# Patient Record
Sex: MALE | Race: WHITE | NOT HISPANIC OR LATINO | Employment: FULL TIME | URBAN - METROPOLITAN AREA
[De-identification: names, ages, dates, MRNs, and addresses within clinical notes are randomized per-mention and may not be internally consistent; named-entity substitution may affect disease eponyms.]

---
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
## 2024-01-15 DIAGNOSIS — E10.9 TYPE 1 DIABETES MELLITUS WITH HEMOGLOBIN A1C GOAL OF LESS THAN 7.0% (MULTI): ICD-10-CM

## 2024-01-16 DIAGNOSIS — E10.9 TYPE 1 DIABETES MELLITUS WITH HEMOGLOBIN A1C GOAL OF LESS THAN 7.0% (MULTI): ICD-10-CM

## 2024-01-17 DIAGNOSIS — E10.9 TYPE 1 DIABETES MELLITUS WITH HEMOGLOBIN A1C GOAL OF LESS THAN 7.0% (MULTI): ICD-10-CM

## 2024-01-17 PROCEDURE — RXMED WILLOW AMBULATORY MEDICATION CHARGE

## 2024-01-17 RX ORDER — BLOOD-GLUCOSE TRANSMITTER
EACH MISCELLANEOUS
Qty: 1 EACH | Refills: 3 | Status: SHIPPED | OUTPATIENT
Start: 2024-01-17 | End: 2025-01-13

## 2024-01-17 RX ORDER — INSULIN LISPRO 100 [IU]/ML
INJECTION, SOLUTION INTRAVENOUS; SUBCUTANEOUS
Qty: 80 ML | Refills: 3 | Status: SHIPPED | OUTPATIENT
Start: 2024-01-17

## 2024-01-17 RX ORDER — INSULIN ASPART 100 [IU]/ML
INJECTION, SOLUTION INTRAVENOUS; SUBCUTANEOUS
Qty: 30 ML | Refills: 6 | OUTPATIENT
Start: 2024-01-17

## 2024-01-17 RX ORDER — INSULIN ASPART 100 [IU]/ML
INJECTION, SOLUTION INTRAVENOUS; SUBCUTANEOUS
Qty: 30 ML | Refills: 6 | Status: SHIPPED | OUTPATIENT
Start: 2024-01-17 | End: 2024-01-18 | Stop reason: CLARIF

## 2024-01-17 RX ORDER — BLOOD-GLUCOSE SENSOR
EACH MISCELLANEOUS
Qty: 9 EACH | Refills: 3 | Status: SHIPPED | OUTPATIENT
Start: 2024-01-17

## 2024-01-18 ENCOUNTER — PHARMACY VISIT (OUTPATIENT)
Dept: PHARMACY | Facility: CLINIC | Age: 19
End: 2024-01-18
Payer: COMMERCIAL

## 2024-01-19 ENCOUNTER — PHARMACY VISIT (OUTPATIENT)
Dept: PHARMACY | Facility: CLINIC | Age: 19
End: 2024-01-19

## 2024-01-25 ENCOUNTER — APPOINTMENT (OUTPATIENT)
Dept: PEDIATRIC ENDOCRINOLOGY | Facility: CLINIC | Age: 19
End: 2024-01-25
Payer: COMMERCIAL

## 2024-02-01 ENCOUNTER — APPOINTMENT (OUTPATIENT)
Dept: PEDIATRIC ENDOCRINOLOGY | Facility: CLINIC | Age: 19
End: 2024-02-01
Payer: COMMERCIAL

## 2024-02-29 ENCOUNTER — OFFICE VISIT (OUTPATIENT)
Dept: PEDIATRIC ENDOCRINOLOGY | Facility: CLINIC | Age: 19
End: 2024-02-29
Payer: COMMERCIAL

## 2024-02-29 VITALS
BODY MASS INDEX: 32.98 KG/M2 | DIASTOLIC BLOOD PRESSURE: 72 MMHG | HEART RATE: 58 BPM | WEIGHT: 230.38 LBS | SYSTOLIC BLOOD PRESSURE: 120 MMHG | HEIGHT: 70 IN | RESPIRATION RATE: 16 BRPM

## 2024-02-29 DIAGNOSIS — E10.65 TYPE 1 DIABETES MELLITUS WITH HYPERGLYCEMIA (MULTI): Primary | ICD-10-CM

## 2024-02-29 DIAGNOSIS — E66.9 CLASS 1 OBESITY: ICD-10-CM

## 2024-02-29 PROBLEM — E66.811 CLASS 1 OBESITY: Status: ACTIVE | Noted: 2024-02-29

## 2024-02-29 LAB — POC HEMOGLOBIN A1C: 7.6 % (ref 4.2–6.5)

## 2024-02-29 PROCEDURE — 3074F SYST BP LT 130 MM HG: CPT | Performed by: INTERNAL MEDICINE

## 2024-02-29 PROCEDURE — 95251 CONT GLUC MNTR ANALYSIS I&R: CPT | Performed by: INTERNAL MEDICINE

## 2024-02-29 PROCEDURE — 83036 HEMOGLOBIN GLYCOSYLATED A1C: CPT | Performed by: INTERNAL MEDICINE

## 2024-02-29 PROCEDURE — 3078F DIAST BP <80 MM HG: CPT | Performed by: INTERNAL MEDICINE

## 2024-02-29 PROCEDURE — 99214 OFFICE O/P EST MOD 30 MIN: CPT | Performed by: INTERNAL MEDICINE

## 2024-02-29 RX ORDER — BLOOD-GLUCOSE METER
EACH MISCELLANEOUS
Status: ON HOLD | COMMUNITY
Start: 2022-10-20 | End: 2024-03-25 | Stop reason: ENTERED-IN-ERROR

## 2024-02-29 RX ORDER — INSULIN GLARGINE-YFGN 100 [IU]/ML
INJECTION, SOLUTION SUBCUTANEOUS
Status: ON HOLD | COMMUNITY
Start: 2022-03-10 | End: 2024-03-24 | Stop reason: WASHOUT

## 2024-02-29 RX ORDER — BLOOD KETONE TEST, STRIPS
STRIP MISCELLANEOUS
Status: ON HOLD | COMMUNITY
Start: 2022-02-07 | End: 2024-03-25 | Stop reason: ENTERED-IN-ERROR

## 2024-02-29 RX ORDER — BLOOD SUGAR DIAGNOSTIC
STRIP MISCELLANEOUS
Status: ON HOLD | COMMUNITY
Start: 2019-03-01 | End: 2024-03-25 | Stop reason: ENTERED-IN-ERROR

## 2024-02-29 RX ORDER — GLUCAGON 1 MG
VIAL (EA) INJECTION
Status: ON HOLD | COMMUNITY
Start: 2016-04-18 | End: 2024-03-25 | Stop reason: ENTERED-IN-ERROR

## 2024-02-29 NOTE — PROGRESS NOTES
Subjective   Alfie Kruger is a 18 y.o. male with type 1 diabetes.   Today Alfie presents to clinic with his girlfriend, Joy    Other Medical History:  celiac disease  Manages diabetes with Tandem x2 with Control IQ    CURRENT PUMP SETTINGS:  Tandem x2 with Control IQ   insulin aspart 100 unit/mL (3 mL) pen (NovoLOG)   Active insulin time = 5 hours with Control IQ      Basal Rate   Total Basal Dose: 37.8 units/day   Time units/hr   12:00 AM 1.5    3:00 AM 1.5    6:00 AM 1.6   11:00 AM 1.6    5:00 PM 1.6    9:00 PM 1.6      Blood Glucose Target   Time mg/dL   12:00  - 130    3:00  - 130    6:00  - 110   11:00  - 110    5:00  - 110    9:00  - 130      Sensitivity Factor   Time mg/dL/unit   12:00 AM 20    3:00 AM 20    6:00 AM 20   11:00 AM 20    5:00 PM 20    9:00 PM 20      Carb Ratio   Time g/unit   12:00 AM 7    3:00 AM 7    6:00 AM 5.5   11:00 AM 5.5    5:00 PM 5.5    9:00 PM 5.5     Back up for pump failure   insulin glargine-yfgn 100 unit/mL (3 mL) Pen   Last edited by Susie Daniel RN on 2/29/2024 at 8:33 AM      Restart pump 24 hours after last glargine dose      Time of Day Dose (units)   once daily with pump failure 40     -TDD: 86 units  -Total daily basal: 86 units (63%)  -Daily carb average: 20g    GLUCOSE MONITORING  CGM Type: Dexcom G6 (discussed G7)  Time in range 70-180mg/dL (%): 49  Time low <70mg/dL (%): <1  CGM wear time (%): 100  BG average: 195  Patterns: prandial hyperglycemia associated with missed carb boluses (or under estimating carbs)    Social: just started new job     Screens:  Eye exam: 12/2022  Labs: 6/2023  Flu shot: declines  Insulin Injections/Pump sites:   - Gives mealtime insulin after eating.  - Site rotation: Abdomen for pump     Carbohydrate counting:   - Patient states they are poor at counting carbs.  - Patient states they are poor at adherence to bolusing for carbs.     Hypoglycemia:  - uses juice or Red Bull to treat lows  - treats  "with ? gms carbs--using Red Bull  - Nocturnal hypoglycemia: no  Checks ketones with: not checking unless high and not feeling well     Exercise: working at [employer] shop     Education Reviewed: hyperglycemia, hypoglycemia    DIABETES Hx:  Date of Diabetes Diagnosis: 03/01/06  Hypoglycemia Unawareness : No  ED/Hospitalizations related to Diabetes: No  ED/Hospitalization not related to Diabetes: No  ED/Hospitalization related to DKA: No  Severe Hypoglycemia (coma, seizure, disorientation, or the need for high dose glucagon) since last visit: No    Review of Systems neg except as above    Objective   /72 (BP Location: Right arm, Patient Position: Sitting)   Pulse 58   Resp 16   Ht 1.77 m (5' 9.69\")   Wt 104 kg (230 lb 6.1 oz)   BMI 33.36 kg/m²      Physical Exam  Constitutional:       Appearance: Normal appearance.   Eyes:      Conjunctiva/sclera: Conjunctivae normal.   Neck:      Thyroid: No thyromegaly.   Pulmonary:      Effort: Pulmonary effort is normal.   Skin:     General: Skin is warm and dry.   Neurological:      General: No focal deficit present.      Mental Status: He is alert.         Lab Results   Component Value Date    HGBA1C 7.6 (A) 02/29/2024    HGBA1C 7.7 (A) 06/23/2023    HGBA1C 9.2 (A) 11/15/2021    HGBA1C 8.6 (A) 09/09/2021       Assessment/Plan   Alfie Kruger is a 18 y.o. male with  Type 1 diabetes mellitus with hyperglycemia    A1c above goal / TIR below goal assoc w/ under-bolusing for carbs significantly (avg 20g/day)   Otherwise doing well with Control IQ system  Class 1 obesity   BMI 33.4   BP normal     PLAN:  No changes to pump settings; stressed importance of bolusing for all carbs eaten  Due for eye exam, reminded to schedule  FUV 3 mo    CGM Interpretation:  14 day CGM download was reviewed in detail as documented above under GLUCOSE MONITORING and will be attached to chart.  A minimum of 72 hours of glucose data was used to inform the management plan outlined above.    "

## 2024-03-24 ENCOUNTER — APPOINTMENT (OUTPATIENT)
Dept: RADIOLOGY | Facility: HOSPITAL | Age: 19
End: 2024-03-24
Payer: COMMERCIAL

## 2024-03-24 ENCOUNTER — HOSPITAL ENCOUNTER (OUTPATIENT)
Facility: HOSPITAL | Age: 19
Setting detail: OBSERVATION
Discharge: HOME | End: 2024-03-25
Attending: EMERGENCY MEDICINE | Admitting: STUDENT IN AN ORGANIZED HEALTH CARE EDUCATION/TRAINING PROGRAM
Payer: COMMERCIAL

## 2024-03-24 ENCOUNTER — APPOINTMENT (OUTPATIENT)
Dept: CARDIOLOGY | Facility: HOSPITAL | Age: 19
End: 2024-03-24
Payer: COMMERCIAL

## 2024-03-24 DIAGNOSIS — R11.0 NAUSEA: Primary | ICD-10-CM

## 2024-03-24 DIAGNOSIS — E66.9 CLASS 1 OBESITY: ICD-10-CM

## 2024-03-24 DIAGNOSIS — R50.9 FEVER, UNSPECIFIED FEVER CAUSE: ICD-10-CM

## 2024-03-24 DIAGNOSIS — R82.4 KETONURIA: ICD-10-CM

## 2024-03-24 DIAGNOSIS — R00.0 TACHYCARDIA: ICD-10-CM

## 2024-03-24 DIAGNOSIS — E13.10: ICD-10-CM

## 2024-03-24 PROBLEM — E11.10 DIABETIC KETOSIS: Status: ACTIVE | Noted: 2024-03-24

## 2024-03-24 LAB
ALBUMIN SERPL BCP-MCNC: 3.9 G/DL (ref 3.4–5)
ALP SERPL-CCNC: 70 U/L (ref 33–120)
ALT SERPL W P-5'-P-CCNC: 12 U/L (ref 10–52)
ANION GAP BLDV CALCULATED.4IONS-SCNC: 7 MMOL/L (ref 10–25)
ANION GAP SERPL CALC-SCNC: 13 MMOL/L (ref 10–20)
APPEARANCE UR: ABNORMAL
AST SERPL W P-5'-P-CCNC: 20 U/L (ref 9–39)
BASE EXCESS BLDV CALC-SCNC: 2.6 MMOL/L (ref -2–3)
BASOPHILS # BLD AUTO: 0.03 X10*3/UL (ref 0–0.1)
BASOPHILS NFR BLD AUTO: 0.3 %
BILIRUB SERPL-MCNC: 0.8 MG/DL (ref 0–1.2)
BILIRUB UR STRIP.AUTO-MCNC: NEGATIVE MG/DL
BODY TEMPERATURE: ABNORMAL
BUN SERPL-MCNC: 14 MG/DL (ref 6–23)
CA-I BLDV-SCNC: 1.1 MMOL/L (ref 1.1–1.33)
CALCIUM SERPL-MCNC: 8.1 MG/DL (ref 8.6–10.3)
CHLORIDE BLDV-SCNC: 102 MMOL/L (ref 98–107)
CHLORIDE SERPL-SCNC: 102 MMOL/L (ref 98–107)
CO2 SERPL-SCNC: 24 MMOL/L (ref 21–32)
COLOR UR: ABNORMAL
CREAT SERPL-MCNC: 0.92 MG/DL (ref 0.5–1.3)
EGFRCR SERPLBLD CKD-EPI 2021: >90 ML/MIN/1.73M*2
EOSINOPHIL # BLD AUTO: 0.01 X10*3/UL (ref 0–0.7)
EOSINOPHIL NFR BLD AUTO: 0.1 %
ERYTHROCYTE [DISTWIDTH] IN BLOOD BY AUTOMATED COUNT: 12.4 % (ref 11.5–14.5)
FLUAV RNA RESP QL NAA+PROBE: NOT DETECTED
FLUBV RNA RESP QL NAA+PROBE: NOT DETECTED
GLUCOSE BLD MANUAL STRIP-MCNC: 148 MG/DL (ref 74–99)
GLUCOSE BLD MANUAL STRIP-MCNC: 75 MG/DL (ref 74–99)
GLUCOSE BLD MANUAL STRIP-MCNC: 86 MG/DL (ref 74–99)
GLUCOSE BLDV-MCNC: 165 MG/DL (ref 74–99)
GLUCOSE SERPL-MCNC: 154 MG/DL (ref 74–99)
GLUCOSE UR STRIP.AUTO-MCNC: ABNORMAL MG/DL
HCO3 BLDV-SCNC: 27.9 MMOL/L (ref 22–26)
HCT VFR BLD AUTO: 48.6 % (ref 41–52)
HCT VFR BLD EST: 50 % (ref 41–52)
HGB BLD-MCNC: 16.3 G/DL (ref 13.5–17.5)
HGB BLDV-MCNC: 16.6 G/DL (ref 13.5–17.5)
IMM GRANULOCYTES # BLD AUTO: 0.02 X10*3/UL (ref 0–0.7)
IMM GRANULOCYTES NFR BLD AUTO: 0.2 % (ref 0–0.9)
INHALED O2 CONCENTRATION: 21 %
KETONES UR STRIP.AUTO-MCNC: ABNORMAL MG/DL
LACTATE BLDV-SCNC: 1.2 MMOL/L (ref 0.4–2)
LEUKOCYTE ESTERASE UR QL STRIP.AUTO: NEGATIVE
LYMPHOCYTES # BLD AUTO: 1.18 X10*3/UL (ref 1.2–4.8)
LYMPHOCYTES NFR BLD AUTO: 10.8 %
MAGNESIUM SERPL-MCNC: 1.64 MG/DL (ref 1.6–2.4)
MCH RBC QN AUTO: 28.8 PG (ref 26–34)
MCHC RBC AUTO-ENTMCNC: 33.5 G/DL (ref 32–36)
MCV RBC AUTO: 86 FL (ref 80–100)
MONOCYTES # BLD AUTO: 1.16 X10*3/UL (ref 0.1–1)
MONOCYTES NFR BLD AUTO: 10.6 %
MUCOUS THREADS #/AREA URNS AUTO: NORMAL /LPF
NEUTROPHILS # BLD AUTO: 8.56 X10*3/UL (ref 1.2–7.7)
NEUTROPHILS NFR BLD AUTO: 78 %
NITRITE UR QL STRIP.AUTO: NEGATIVE
NRBC BLD-RTO: 0 /100 WBCS (ref 0–0)
OXYHGB MFR BLDV: 52.9 % (ref 45–75)
PCO2 BLDV: 44 MM HG (ref 41–51)
PH BLDV: 7.41 PH (ref 7.33–7.43)
PH UR STRIP.AUTO: 5 [PH]
PHOSPHATE SERPL-MCNC: 3.5 MG/DL (ref 2.5–4.9)
PLATELET # BLD AUTO: 176 X10*3/UL (ref 150–450)
PO2 BLDV: 35 MM HG (ref 35–45)
POTASSIUM BLDV-SCNC: 3.8 MMOL/L (ref 3.5–5.3)
POTASSIUM SERPL-SCNC: 4.1 MMOL/L (ref 3.5–5.3)
PROT SERPL-MCNC: 6.8 G/DL (ref 6.4–8.2)
PROT UR STRIP.AUTO-MCNC: ABNORMAL MG/DL
RBC # BLD AUTO: 5.66 X10*6/UL (ref 4.5–5.9)
RBC # UR STRIP.AUTO: NEGATIVE /UL
RBC #/AREA URNS AUTO: NORMAL /HPF
RSV RNA RESP QL NAA+PROBE: NOT DETECTED
S PYO DNA THROAT QL NAA+PROBE: NOT DETECTED
SAO2 % BLDV: 54 % (ref 45–75)
SARS-COV-2 RNA RESP QL NAA+PROBE: NOT DETECTED
SODIUM BLDV-SCNC: 133 MMOL/L (ref 136–145)
SODIUM SERPL-SCNC: 135 MMOL/L (ref 136–145)
SP GR UR STRIP.AUTO: 1.03
UROBILINOGEN UR STRIP.AUTO-MCNC: 2 MG/DL
WBC # BLD AUTO: 11 X10*3/UL (ref 4.4–11.3)
WBC #/AREA URNS AUTO: NORMAL /HPF

## 2024-03-24 PROCEDURE — 99223 1ST HOSP IP/OBS HIGH 75: CPT

## 2024-03-24 PROCEDURE — 96361 HYDRATE IV INFUSION ADD-ON: CPT

## 2024-03-24 PROCEDURE — 83735 ASSAY OF MAGNESIUM: CPT | Performed by: PHYSICIAN ASSISTANT

## 2024-03-24 PROCEDURE — 84132 ASSAY OF SERUM POTASSIUM: CPT | Performed by: PHYSICIAN ASSISTANT

## 2024-03-24 PROCEDURE — 84100 ASSAY OF PHOSPHORUS: CPT | Performed by: PHYSICIAN ASSISTANT

## 2024-03-24 PROCEDURE — 93005 ELECTROCARDIOGRAM TRACING: CPT

## 2024-03-24 PROCEDURE — G0378 HOSPITAL OBSERVATION PER HR: HCPCS

## 2024-03-24 PROCEDURE — 99285 EMERGENCY DEPT VISIT HI MDM: CPT | Mod: 25

## 2024-03-24 PROCEDURE — 71045 X-RAY EXAM CHEST 1 VIEW: CPT

## 2024-03-24 PROCEDURE — 82947 ASSAY GLUCOSE BLOOD QUANT: CPT | Mod: 59

## 2024-03-24 PROCEDURE — 71045 X-RAY EXAM CHEST 1 VIEW: CPT | Performed by: RADIOLOGY

## 2024-03-24 PROCEDURE — 2500000004 HC RX 250 GENERAL PHARMACY W/ HCPCS (ALT 636 FOR OP/ED): Performed by: PHYSICIAN ASSISTANT

## 2024-03-24 PROCEDURE — 87651 STREP A DNA AMP PROBE: CPT | Performed by: PHYSICIAN ASSISTANT

## 2024-03-24 PROCEDURE — 36415 COLL VENOUS BLD VENIPUNCTURE: CPT | Performed by: PHYSICIAN ASSISTANT

## 2024-03-24 PROCEDURE — 96375 TX/PRO/DX INJ NEW DRUG ADDON: CPT

## 2024-03-24 PROCEDURE — 82947 ASSAY GLUCOSE BLOOD QUANT: CPT

## 2024-03-24 PROCEDURE — 81001 URINALYSIS AUTO W/SCOPE: CPT | Performed by: PHYSICIAN ASSISTANT

## 2024-03-24 PROCEDURE — 87637 SARSCOV2&INF A&B&RSV AMP PRB: CPT | Performed by: PHYSICIAN ASSISTANT

## 2024-03-24 PROCEDURE — 85025 COMPLETE CBC W/AUTO DIFF WBC: CPT | Performed by: PHYSICIAN ASSISTANT

## 2024-03-24 PROCEDURE — 87075 CULTR BACTERIA EXCEPT BLOOD: CPT | Mod: 59,GEALAB | Performed by: PHYSICIAN ASSISTANT

## 2024-03-24 RX ORDER — INSULIN LISPRO 100 [IU]/ML
3 INJECTION, SOLUTION INTRAVENOUS; SUBCUTANEOUS AS NEEDED
Status: DISCONTINUED | OUTPATIENT
Start: 2024-03-24 | End: 2024-03-25 | Stop reason: HOSPADM

## 2024-03-24 RX ORDER — POLYETHYLENE GLYCOL 3350 17 G/17G
17 POWDER, FOR SOLUTION ORAL DAILY
Status: DISCONTINUED | OUTPATIENT
Start: 2024-03-24 | End: 2024-03-25 | Stop reason: HOSPADM

## 2024-03-24 RX ORDER — SODIUM CHLORIDE, SODIUM LACTATE, POTASSIUM CHLORIDE, CALCIUM CHLORIDE 600; 310; 30; 20 MG/100ML; MG/100ML; MG/100ML; MG/100ML
75 INJECTION, SOLUTION INTRAVENOUS CONTINUOUS
Status: DISCONTINUED | OUTPATIENT
Start: 2024-03-24 | End: 2024-03-25

## 2024-03-24 RX ORDER — ONDANSETRON HYDROCHLORIDE 2 MG/ML
4 INJECTION, SOLUTION INTRAVENOUS ONCE
Status: COMPLETED | OUTPATIENT
Start: 2024-03-24 | End: 2024-03-24

## 2024-03-24 RX ORDER — DEXTROSE 50 % IN WATER (D50W) INTRAVENOUS SYRINGE
12.5
Status: DISCONTINUED | OUTPATIENT
Start: 2024-03-24 | End: 2024-03-25 | Stop reason: HOSPADM

## 2024-03-24 RX ORDER — DEXTROSE 50 % IN WATER (D50W) INTRAVENOUS SYRINGE
25
Status: DISCONTINUED | OUTPATIENT
Start: 2024-03-24 | End: 2024-03-25 | Stop reason: HOSPADM

## 2024-03-24 RX ORDER — ACETAMINOPHEN 325 MG/1
975 TABLET ORAL ONCE
Status: COMPLETED | OUTPATIENT
Start: 2024-03-24 | End: 2024-03-24

## 2024-03-24 RX ADMIN — SODIUM CHLORIDE 1000 ML: 9 INJECTION, SOLUTION INTRAVENOUS at 16:11

## 2024-03-24 RX ADMIN — ONDANSETRON 4 MG: 2 INJECTION INTRAMUSCULAR; INTRAVENOUS at 14:57

## 2024-03-24 RX ADMIN — ACETAMINOPHEN 975 MG: 325 TABLET ORAL at 16:05

## 2024-03-24 RX ADMIN — SODIUM CHLORIDE 1000 ML: 9 INJECTION, SOLUTION INTRAVENOUS at 14:57

## 2024-03-24 SDOH — SOCIAL STABILITY: SOCIAL INSECURITY: DO YOU FEEL ANYONE HAS EXPLOITED OR TAKEN ADVANTAGE OF YOU FINANCIALLY OR OF YOUR PERSONAL PROPERTY?: NO

## 2024-03-24 SDOH — SOCIAL STABILITY: SOCIAL INSECURITY: ARE YOU OR HAVE YOU BEEN THREATENED OR ABUSED PHYSICALLY, EMOTIONALLY, OR SEXUALLY BY ANYONE?: NO

## 2024-03-24 SDOH — SOCIAL STABILITY: SOCIAL INSECURITY: ARE THERE ANY APPARENT SIGNS OF INJURIES/BEHAVIORS THAT COULD BE RELATED TO ABUSE/NEGLECT?: NO

## 2024-03-24 SDOH — SOCIAL STABILITY: SOCIAL INSECURITY: HAVE YOU HAD THOUGHTS OF HARMING ANYONE ELSE?: NO

## 2024-03-24 SDOH — SOCIAL STABILITY: SOCIAL INSECURITY: DOES ANYONE TRY TO KEEP YOU FROM HAVING/CONTACTING OTHER FRIENDS OR DOING THINGS OUTSIDE YOUR HOME?: NO

## 2024-03-24 SDOH — SOCIAL STABILITY: SOCIAL INSECURITY: HAS ANYONE EVER THREATENED TO HURT YOUR FAMILY OR YOUR PETS?: NO

## 2024-03-24 SDOH — SOCIAL STABILITY: SOCIAL INSECURITY: DO YOU FEEL UNSAFE GOING BACK TO THE PLACE WHERE YOU ARE LIVING?: NO

## 2024-03-24 SDOH — SOCIAL STABILITY: SOCIAL INSECURITY: WERE YOU ABLE TO COMPLETE ALL THE BEHAVIORAL HEALTH SCREENINGS?: YES

## 2024-03-24 SDOH — SOCIAL STABILITY: SOCIAL INSECURITY: ABUSE: ADULT

## 2024-03-24 ASSESSMENT — COGNITIVE AND FUNCTIONAL STATUS - GENERAL
MOBILITY SCORE: 24
PATIENT BASELINE BEDBOUND: NO
MOBILITY SCORE: 24
DAILY ACTIVITIY SCORE: 24
DAILY ACTIVITIY SCORE: 24

## 2024-03-24 ASSESSMENT — PATIENT HEALTH QUESTIONNAIRE - PHQ9
2. FEELING DOWN, DEPRESSED OR HOPELESS: NOT AT ALL
SUM OF ALL RESPONSES TO PHQ9 QUESTIONS 1 & 2: 0
1. LITTLE INTEREST OR PLEASURE IN DOING THINGS: NOT AT ALL

## 2024-03-24 ASSESSMENT — ACTIVITIES OF DAILY LIVING (ADL)
JUDGMENT_ADEQUATE_SAFELY_COMPLETE_DAILY_ACTIVITIES: YES
ADEQUATE_TO_COMPLETE_ADL: YES
WALKS IN HOME: INDEPENDENT
HEARING - RIGHT EAR: FUNCTIONAL
LACK_OF_TRANSPORTATION: NO
BATHING: INDEPENDENT
DRESSING YOURSELF: INDEPENDENT
FEEDING YOURSELF: INDEPENDENT
PATIENT'S MEMORY ADEQUATE TO SAFELY COMPLETE DAILY ACTIVITIES?: YES
HEARING - LEFT EAR: FUNCTIONAL
GROOMING: INDEPENDENT
TOILETING: INDEPENDENT

## 2024-03-24 ASSESSMENT — PAIN SCALES - GENERAL
PAINLEVEL_OUTOF10: 0 - NO PAIN

## 2024-03-24 ASSESSMENT — LIFESTYLE VARIABLES
EVER FELT BAD OR GUILTY ABOUT YOUR DRINKING: NO
EVER HAD A DRINK FIRST THING IN THE MORNING TO STEADY YOUR NERVES TO GET RID OF A HANGOVER: NO
AUDIT-C TOTAL SCORE: 0
HOW OFTEN DO YOU HAVE 6 OR MORE DRINKS ON ONE OCCASION: NEVER
HAVE PEOPLE ANNOYED YOU BY CRITICIZING YOUR DRINKING: NO
AUDIT-C TOTAL SCORE: 0
SKIP TO QUESTIONS 9-10: 1
TOTAL SCORE: 0
HOW OFTEN DO YOU HAVE A DRINK CONTAINING ALCOHOL: NEVER
HOW MANY STANDARD DRINKS CONTAINING ALCOHOL DO YOU HAVE ON A TYPICAL DAY: PATIENT DOES NOT DRINK
HAVE YOU EVER FELT YOU SHOULD CUT DOWN ON YOUR DRINKING: NO

## 2024-03-24 ASSESSMENT — COLUMBIA-SUICIDE SEVERITY RATING SCALE - C-SSRS
2. HAVE YOU ACTUALLY HAD ANY THOUGHTS OF KILLING YOURSELF?: NO
6. HAVE YOU EVER DONE ANYTHING, STARTED TO DO ANYTHING, OR PREPARED TO DO ANYTHING TO END YOUR LIFE?: NO
1. IN THE PAST MONTH, HAVE YOU WISHED YOU WERE DEAD OR WISHED YOU COULD GO TO SLEEP AND NOT WAKE UP?: NO

## 2024-03-24 ASSESSMENT — ENCOUNTER SYMPTOMS
ABDOMINAL PAIN: 1
NAUSEA: 1

## 2024-03-24 ASSESSMENT — PAIN - FUNCTIONAL ASSESSMENT: PAIN_FUNCTIONAL_ASSESSMENT: 0-10

## 2024-03-24 NOTE — ED TRIAGE NOTES
Patient is a type 1 diabetic, is c/o BG in the 300's for the past 2 days with nausea and weakness. Patient has a hx of DKA.
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
## 2024-03-24 NOTE — ED NOTES
Pt arrives with c/o nausea and elevated blood sugar readings ongoing X2 days. Pt is a known type 1 diabetic with a hx of DKA. Pt concerned for DKA today. Pt currently in no obvious distress at this time.      Justin Jung RN  03/24/24 9232
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
## 2024-03-25 VITALS
OXYGEN SATURATION: 97 % | WEIGHT: 232.59 LBS | RESPIRATION RATE: 20 BRPM | DIASTOLIC BLOOD PRESSURE: 48 MMHG | TEMPERATURE: 99.5 F | BODY MASS INDEX: 33.3 KG/M2 | HEIGHT: 70 IN | SYSTOLIC BLOOD PRESSURE: 103 MMHG | HEART RATE: 116 BPM

## 2024-03-25 PROBLEM — R00.0 TACHYCARDIA: Status: RESOLVED | Noted: 2024-03-24 | Resolved: 2024-03-25

## 2024-03-25 PROBLEM — E13.10: Status: RESOLVED | Noted: 2024-03-24 | Resolved: 2024-03-25

## 2024-03-25 PROBLEM — E11.10 DIABETIC KETOSIS: Status: RESOLVED | Noted: 2024-03-24 | Resolved: 2024-03-25

## 2024-03-25 LAB
ALBUMIN SERPL BCP-MCNC: 3.5 G/DL (ref 3.4–5)
ANION GAP SERPL CALC-SCNC: 15 MMOL/L (ref 10–20)
BUN SERPL-MCNC: 11 MG/DL (ref 6–23)
CALCIUM SERPL-MCNC: 7.4 MG/DL (ref 8.6–10.3)
CHLORIDE SERPL-SCNC: 103 MMOL/L (ref 98–107)
CO2 SERPL-SCNC: 23 MMOL/L (ref 21–32)
CREAT SERPL-MCNC: 0.83 MG/DL (ref 0.5–1.3)
CRP SERPL-MCNC: 7.42 MG/DL
EGFRCR SERPLBLD CKD-EPI 2021: >90 ML/MIN/1.73M*2
ERYTHROCYTE [DISTWIDTH] IN BLOOD BY AUTOMATED COUNT: 12.2 % (ref 11.5–14.5)
ERYTHROCYTE [SEDIMENTATION RATE] IN BLOOD BY WESTERGREN METHOD: 7 MM/H (ref 0–15)
GLUCOSE BLD MANUAL STRIP-MCNC: 132 MG/DL (ref 74–99)
GLUCOSE BLD MANUAL STRIP-MCNC: 144 MG/DL (ref 74–99)
GLUCOSE BLD MANUAL STRIP-MCNC: 174 MG/DL (ref 74–99)
GLUCOSE SERPL-MCNC: 127 MG/DL (ref 74–99)
HCT VFR BLD AUTO: 44.9 % (ref 41–52)
HGB BLD-MCNC: 14.8 G/DL (ref 13.5–17.5)
HOLD SPECIMEN: NORMAL
MAGNESIUM SERPL-MCNC: 1.44 MG/DL (ref 1.6–2.4)
MCH RBC QN AUTO: 28.3 PG (ref 26–34)
MCHC RBC AUTO-ENTMCNC: 33 G/DL (ref 32–36)
MCV RBC AUTO: 86 FL (ref 80–100)
NRBC BLD-RTO: 0 /100 WBCS (ref 0–0)
PHOSPHATE SERPL-MCNC: 2.5 MG/DL (ref 2.5–4.9)
PLATELET # BLD AUTO: 156 X10*3/UL (ref 150–450)
POTASSIUM SERPL-SCNC: 3.6 MMOL/L (ref 3.5–5.3)
RBC # BLD AUTO: 5.23 X10*6/UL (ref 4.5–5.9)
SODIUM SERPL-SCNC: 137 MMOL/L (ref 136–145)
WBC # BLD AUTO: 7.3 X10*3/UL (ref 4.4–11.3)

## 2024-03-25 PROCEDURE — 96366 THER/PROPH/DIAG IV INF ADDON: CPT

## 2024-03-25 PROCEDURE — G0378 HOSPITAL OBSERVATION PER HR: HCPCS

## 2024-03-25 PROCEDURE — 86140 C-REACTIVE PROTEIN: CPT

## 2024-03-25 PROCEDURE — 96361 HYDRATE IV INFUSION ADD-ON: CPT

## 2024-03-25 PROCEDURE — 96365 THER/PROPH/DIAG IV INF INIT: CPT

## 2024-03-25 PROCEDURE — 36415 COLL VENOUS BLD VENIPUNCTURE: CPT

## 2024-03-25 PROCEDURE — 85652 RBC SED RATE AUTOMATED: CPT

## 2024-03-25 PROCEDURE — 85027 COMPLETE CBC AUTOMATED: CPT

## 2024-03-25 PROCEDURE — 83735 ASSAY OF MAGNESIUM: CPT

## 2024-03-25 PROCEDURE — 99238 HOSP IP/OBS DSCHRG MGMT 30/<: CPT

## 2024-03-25 PROCEDURE — 82947 ASSAY GLUCOSE BLOOD QUANT: CPT | Mod: 59

## 2024-03-25 PROCEDURE — 2500000004 HC RX 250 GENERAL PHARMACY W/ HCPCS (ALT 636 FOR OP/ED)

## 2024-03-25 PROCEDURE — 82947 ASSAY GLUCOSE BLOOD QUANT: CPT

## 2024-03-25 PROCEDURE — 80069 RENAL FUNCTION PANEL: CPT

## 2024-03-25 PROCEDURE — 94760 N-INVAS EAR/PLS OXIMETRY 1: CPT

## 2024-03-25 RX ORDER — MAGNESIUM SULFATE HEPTAHYDRATE 40 MG/ML
2 INJECTION, SOLUTION INTRAVENOUS ONCE
Status: COMPLETED | OUTPATIENT
Start: 2024-03-25 | End: 2024-03-25

## 2024-03-25 RX ADMIN — MAGNESIUM SULFATE HEPTAHYDRATE 2 G: 2 INJECTION, SOLUTION INTRAVENOUS at 11:26

## 2024-03-25 RX ADMIN — SODIUM CHLORIDE, POTASSIUM CHLORIDE, SODIUM LACTATE AND CALCIUM CHLORIDE 1000 ML: 600; 310; 30; 20 INJECTION, SOLUTION INTRAVENOUS at 11:25

## 2024-03-25 ASSESSMENT — ACTIVITIES OF DAILY LIVING (ADL): LACK_OF_TRANSPORTATION: NO

## 2024-03-25 ASSESSMENT — PAIN - FUNCTIONAL ASSESSMENT: PAIN_FUNCTIONAL_ASSESSMENT: 0-10

## 2024-03-25 ASSESSMENT — PAIN SCALES - GENERAL: PAINLEVEL_OUTOF10: 0 - NO PAIN

## 2024-03-25 NOTE — PROGRESS NOTES
03/25/24 1111   Discharge Planning   Living Arrangements Parent   Support Systems Parent   Assistance Needed Alert and oriented x 3, Independent with ADL's, No DME used at home, Insulin pump, Drives, Employed.   Type of Residence Private residence   Number of Stairs to Enter Residence 2   Number of Stairs Within Residence 16   Do you have animals or pets at home? Yes   Type of Animals or Pets 2 Cats   Who is requesting discharge planning? Provider   Home or Post Acute Services None   Patient expects to be discharged to: Home with parent with no discharge needs identified   Does the patient need discharge transport arranged? No   Financial Resource Strain   How hard is it for you to pay for the very basics like food, housing, medical care, and heating? Not hard   Housing Stability   In the last 12 months, was there a time when you were not able to pay the mortgage or rent on time? N   In the last 12 months, how many places have you lived? 1   In the last 12 months, was there a time when you did not have a steady place to sleep or slept in a shelter (including now)? N   Transportation Needs   In the past 12 months, has lack of transportation kept you from medical appointments or from getting medications? no   In the past 12 months, has lack of transportation kept you from meetings, work, or from getting things needed for daily living? No   Patient Choice   Provider Choice list and CMS website (https://medicare.gov/care-compare#search) for post-acute Quality and Resource Measure Data were provided and reviewed with: Family;Patient   Patient / Family choosing to utilize agency / facility established prior to hospitalization No

## 2024-03-25 NOTE — DISCHARGE SUMMARY
Discharge Diagnosis  Diabetic ketosis (CMS/HCC)    Issues Requiring Follow-Up  None    Discharge Meds     Your medication list        CONTINUE taking these medications        Instructions Last Dose Given Next Dose Due   Dexcom G6 Sensor device  Generic drug: blood-glucose sensor      Use as directed changing every 10 days       Dexcom G6 Transmitter device  Generic drug: blood-glucose transmitter device      Use as directed changing every 90 days       insulin lispro 100 unit/mL injection  Commonly known as: HumaLOG      Use up to 80 units daily via insulin pump                Test Results Pending At Discharge  Pending Labs       Order Current Status    Blood Culture Preliminary result    Blood Culture Preliminary result            Hospital Course   Alfie Kruger is a 18 y.o. male presenting with with past medical history of Type 1 DM on insulin pump comes to the emergency department complaining of nausea and high blood sugars, which improved on the following day and medical management and was able to tolerate PO intake and was hemodynamically stable for discharge home.    Pertinent Physical Exam At Time of Discharge  Physical Exam  Constitutional:       General: He is not in acute distress.     Appearance: Normal appearance.   HENT:      Head: Normocephalic and atraumatic.      Mouth/Throat:      Mouth: Mucous membranes are moist.   Eyes:      Conjunctiva/sclera: Conjunctivae normal.      Pupils: Pupils are equal, round, and reactive to light.   Cardiovascular:      Rate and Rhythm: Normal rate and regular rhythm.      Heart sounds: Normal heart sounds.   Pulmonary:      Effort: No respiratory distress.      Breath sounds: Normal breath sounds. No wheezing or rhonchi.   Abdominal:      General: Bowel sounds are normal.      Palpations: Abdomen is soft.      Tenderness: There is no abdominal tenderness.   Musculoskeletal:         General: No swelling.      Cervical back: Neck supple.   Skin:     General: Skin is warm  and dry.   Neurological:      General: No focal deficit present.      Mental Status: He is alert.         Outpatient Follow-Up  Future Appointments   Date Time Provider Department Center   6/13/2024  2:10 PM Susie Daniel RN [id number] Elton Tran DO

## 2024-03-25 NOTE — DISCHARGE INSTRUCTIONS
1) Please, take your home medications as instructed after being discharged from the hospital.    2) Please, follow-up with your primary care provider within 7 to 14 days after leaving the hospital. /appointment services has been requested to make an appointment for you, however if you do not hear back from them within 1 to 2 days, please call your primary physician's office to schedule an appointment. Bring your photo ID and insurance card to your appointment.   [facility]  services can be reached at [phone].    - Please, call   or appointment services and schedule a follow-up with your PCP and Endocrinologist within 1-2 weeks after you leave the hospital.    3) If you experience any worsening symptoms or have any concerns, please contact your primary care provider to schedule an appointment. If you cannot get in touch with your primary care physician, please return to the nearest emergency room or urgent care clinic for an evaluation and treatment.    Thank you for choosing [facility] and allowing us to partake in your medical care!    - Your [facility] inpatient primary care team.

## 2024-03-25 NOTE — CARE PLAN
Uneventful night. Pt rested comfortably. Minimal c/o pain this shift. BS 75 and 144 this shift. Pt refused IV fluids throughout the night, as he did not want them to interfere with his sleep. Pt continues to progress towards meeting goals. Probable DC home this morning. VSS. Will continue to monitor.

## 2024-03-25 NOTE — PROGRESS NOTES
Pharmacy Medication History Review    Alfie Kruger is a 18 y.o. male admitted for Diabetic ketosis (CMS/[facility]). Pharmacy reviewed the patient's [id number] medications and allergies for accuracy.    The list below reflectives the updated PTA list. Please review each medication in order reconciliation for additional clarification and justification.  Medications Prior to Admission   Medication Sig Dispense Refill Last Dose    Dexcom G6 Sensor device Use as directed changing every 10 days 9 each 3     Dexcom G6 Transmitter device Use as directed changing every 90 days 1 each 3     insulin lispro (HumaLOG) 100 unit/mL refill for patient own pump Use up to 80 units daily via insulin pump 80 mL 3         The list below reflectives the updated allergy list. Please review each documented allergy for additional clarification and justification.  Allergies  Reviewed by Bakari Child RN on 3/24/2024        Severity Reactions Comments    Gluten Not Specified Unknown Gluten Free diet required, gluten damages small intestines    Penicillins Low Hives, Rash             Below are additional concerns with the patient's PTA list.  Confirmed medications with patient and mother.     Nikhil Perez RPh

## 2024-03-25 NOTE — CARE PLAN
The patient's goals for the shift include get some sleep and go home in the morning    The clinical goals for the shift include BS will maintain   this shift

## 2024-03-26 ENCOUNTER — PATIENT OUTREACH (OUTPATIENT)
Dept: CARE COORDINATION | Facility: CLINIC | Age: 19
End: 2024-03-26
Payer: COMMERCIAL

## 2024-03-26 ENCOUNTER — HOSPITAL ENCOUNTER (EMERGENCY)
Facility: HOSPITAL | Age: 19
Discharge: HOME | End: 2024-03-26
Attending: EMERGENCY MEDICINE
Payer: COMMERCIAL

## 2024-03-26 VITALS
BODY MASS INDEX: 30.31 KG/M2 | WEIGHT: 223.77 LBS | SYSTOLIC BLOOD PRESSURE: 133 MMHG | DIASTOLIC BLOOD PRESSURE: 76 MMHG | HEART RATE: 90 BPM | RESPIRATION RATE: 19 BRPM | HEIGHT: 72 IN | TEMPERATURE: 99.5 F | OXYGEN SATURATION: 99 %

## 2024-03-26 DIAGNOSIS — R10.33 PERIUMBILICAL ABDOMINAL PAIN: ICD-10-CM

## 2024-03-26 DIAGNOSIS — R11.0 NAUSEA: ICD-10-CM

## 2024-03-26 DIAGNOSIS — B34.9 VIRAL SYNDROME: Primary | ICD-10-CM

## 2024-03-26 LAB
ALBUMIN SERPL BCP-MCNC: 3.9 G/DL (ref 3.4–5)
ALP SERPL-CCNC: 61 U/L (ref 33–120)
ALT SERPL W P-5'-P-CCNC: 12 U/L (ref 10–52)
ANION GAP BLDV CALCULATED.4IONS-SCNC: 7 MMOL/L (ref 10–25)
ANION GAP SERPL CALC-SCNC: 15 MMOL/L (ref 10–20)
AST SERPL W P-5'-P-CCNC: 17 U/L (ref 9–39)
BASE EXCESS BLDV CALC-SCNC: 0.6 MMOL/L (ref -2–3)
BASOPHILS # BLD AUTO: 0.01 X10*3/UL (ref 0–0.1)
BASOPHILS NFR BLD AUTO: 0.1 %
BILIRUB DIRECT SERPL-MCNC: 0.1 MG/DL (ref 0–0.3)
BILIRUB SERPL-MCNC: 0.5 MG/DL (ref 0–1.2)
BODY TEMPERATURE: ABNORMAL
BUN SERPL-MCNC: 11 MG/DL (ref 6–23)
CA-I BLDV-SCNC: 1.14 MMOL/L (ref 1.1–1.33)
CALCIUM SERPL-MCNC: 8.4 MG/DL (ref 8.6–10.3)
CHLORIDE BLDV-SCNC: 104 MMOL/L (ref 98–107)
CHLORIDE SERPL-SCNC: 102 MMOL/L (ref 98–107)
CO2 SERPL-SCNC: 24 MMOL/L (ref 21–32)
CREAT SERPL-MCNC: 1.07 MG/DL (ref 0.5–1.3)
EGFRCR SERPLBLD CKD-EPI 2021: >90 ML/MIN/1.73M*2
EOSINOPHIL # BLD AUTO: 0 X10*3/UL (ref 0–0.7)
EOSINOPHIL NFR BLD AUTO: 0 %
ERYTHROCYTE [DISTWIDTH] IN BLOOD BY AUTOMATED COUNT: 12.1 % (ref 11.5–14.5)
GLUCOSE BLD MANUAL STRIP-MCNC: 135 MG/DL (ref 74–99)
GLUCOSE BLDV-MCNC: 146 MG/DL (ref 74–99)
GLUCOSE SERPL-MCNC: 135 MG/DL (ref 74–99)
HCO3 BLDV-SCNC: 24.6 MMOL/L (ref 22–26)
HCT VFR BLD AUTO: 49.5 % (ref 41–52)
HCT VFR BLD EST: 51 % (ref 41–52)
HGB BLD-MCNC: 16.5 G/DL (ref 13.5–17.5)
HGB BLDV-MCNC: 16.9 G/DL (ref 13.5–17.5)
IMM GRANULOCYTES # BLD AUTO: 0.02 X10*3/UL (ref 0–0.7)
IMM GRANULOCYTES NFR BLD AUTO: 0.2 % (ref 0–0.9)
INHALED O2 CONCENTRATION: 21 %
LACTATE BLDV-SCNC: 1.1 MMOL/L (ref 0.4–2)
LIPASE SERPL-CCNC: <3 U/L (ref 9–82)
LYMPHOCYTES # BLD AUTO: 1.14 X10*3/UL (ref 1.2–4.8)
LYMPHOCYTES NFR BLD AUTO: 12.9 %
MAGNESIUM SERPL-MCNC: 1.93 MG/DL (ref 1.6–2.4)
MCH RBC QN AUTO: 28.2 PG (ref 26–34)
MCHC RBC AUTO-ENTMCNC: 33.3 G/DL (ref 32–36)
MCV RBC AUTO: 85 FL (ref 80–100)
MONOCYTES # BLD AUTO: 0.77 X10*3/UL (ref 0.1–1)
MONOCYTES NFR BLD AUTO: 8.7 %
NEUTROPHILS # BLD AUTO: 6.92 X10*3/UL (ref 1.2–7.7)
NEUTROPHILS NFR BLD AUTO: 78.1 %
NRBC BLD-RTO: 0 /100 WBCS (ref 0–0)
OXYHGB MFR BLDV: 79.4 % (ref 45–75)
PCO2 BLDV: 37 MM HG (ref 41–51)
PH BLDV: 7.43 PH (ref 7.33–7.43)
PHOSPHATE SERPL-MCNC: 4.1 MG/DL (ref 2.5–4.9)
PLATELET # BLD AUTO: 180 X10*3/UL (ref 150–450)
PO2 BLDV: 49 MM HG (ref 35–45)
POTASSIUM BLDV-SCNC: 3.9 MMOL/L (ref 3.5–5.3)
POTASSIUM SERPL-SCNC: 3.8 MMOL/L (ref 3.5–5.3)
PROT SERPL-MCNC: 6.9 G/DL (ref 6.4–8.2)
RBC # BLD AUTO: 5.86 X10*6/UL (ref 4.5–5.9)
SAO2 % BLDV: 82 % (ref 45–75)
SODIUM BLDV-SCNC: 132 MMOL/L (ref 136–145)
SODIUM SERPL-SCNC: 137 MMOL/L (ref 136–145)
WBC # BLD AUTO: 8.9 X10*3/UL (ref 4.4–11.3)

## 2024-03-26 PROCEDURE — 2500000004 HC RX 250 GENERAL PHARMACY W/ HCPCS (ALT 636 FOR OP/ED): Performed by: EMERGENCY MEDICINE

## 2024-03-26 PROCEDURE — 83735 ASSAY OF MAGNESIUM: CPT | Performed by: EMERGENCY MEDICINE

## 2024-03-26 PROCEDURE — 36415 COLL VENOUS BLD VENIPUNCTURE: CPT | Performed by: EMERGENCY MEDICINE

## 2024-03-26 PROCEDURE — 96361 HYDRATE IV INFUSION ADD-ON: CPT

## 2024-03-26 PROCEDURE — 85025 COMPLETE CBC W/AUTO DIFF WBC: CPT | Performed by: EMERGENCY MEDICINE

## 2024-03-26 PROCEDURE — 80076 HEPATIC FUNCTION PANEL: CPT | Performed by: EMERGENCY MEDICINE

## 2024-03-26 PROCEDURE — 84132 ASSAY OF SERUM POTASSIUM: CPT | Performed by: EMERGENCY MEDICINE

## 2024-03-26 PROCEDURE — 99284 EMERGENCY DEPT VISIT MOD MDM: CPT | Mod: 25

## 2024-03-26 PROCEDURE — 82947 ASSAY GLUCOSE BLOOD QUANT: CPT | Mod: 59

## 2024-03-26 PROCEDURE — 84100 ASSAY OF PHOSPHORUS: CPT | Performed by: EMERGENCY MEDICINE

## 2024-03-26 PROCEDURE — 96374 THER/PROPH/DIAG INJ IV PUSH: CPT

## 2024-03-26 PROCEDURE — 83690 ASSAY OF LIPASE: CPT | Performed by: EMERGENCY MEDICINE

## 2024-03-26 RX ORDER — ONDANSETRON 4 MG/1
4 TABLET, ORALLY DISINTEGRATING ORAL EVERY 8 HOURS PRN
Qty: 20 TABLET | Refills: 0 | Status: SHIPPED | OUTPATIENT
Start: 2024-03-26 | End: 2024-04-02

## 2024-03-26 RX ORDER — METOCLOPRAMIDE HYDROCHLORIDE 5 MG/ML
5 INJECTION INTRAMUSCULAR; INTRAVENOUS ONCE
Status: COMPLETED | OUTPATIENT
Start: 2024-03-26 | End: 2024-03-26

## 2024-03-26 RX ORDER — ONDANSETRON 4 MG/1
4 TABLET, ORALLY DISINTEGRATING ORAL EVERY 8 HOURS PRN
Qty: 20 TABLET | Refills: 0 | Status: SHIPPED | OUTPATIENT
Start: 2024-03-26 | End: 2024-03-26 | Stop reason: SDUPTHER

## 2024-03-26 RX ADMIN — METOCLOPRAMIDE 5 MG: 5 INJECTION, SOLUTION INTRAMUSCULAR; INTRAVENOUS at 12:00

## 2024-03-26 RX ADMIN — SODIUM CHLORIDE, POTASSIUM CHLORIDE, SODIUM LACTATE AND CALCIUM CHLORIDE 1000 ML: 600; 310; 30; 20 INJECTION, SOLUTION INTRAVENOUS at 14:20

## 2024-03-26 RX ADMIN — SODIUM CHLORIDE, POTASSIUM CHLORIDE, SODIUM LACTATE AND CALCIUM CHLORIDE 1000 ML: 600; 310; 30; 20 INJECTION, SOLUTION INTRAVENOUS at 12:00

## 2024-03-26 ASSESSMENT — COLUMBIA-SUICIDE SEVERITY RATING SCALE - C-SSRS
1. IN THE PAST MONTH, HAVE YOU WISHED YOU WERE DEAD OR WISHED YOU COULD GO TO SLEEP AND NOT WAKE UP?: NO
6. HAVE YOU EVER DONE ANYTHING, STARTED TO DO ANYTHING, OR PREPARED TO DO ANYTHING TO END YOUR LIFE?: NO
2. HAVE YOU ACTUALLY HAD ANY THOUGHTS OF KILLING YOURSELF?: NO

## 2024-03-26 ASSESSMENT — PAIN - FUNCTIONAL ASSESSMENT: PAIN_FUNCTIONAL_ASSESSMENT: 0-10

## 2024-03-26 ASSESSMENT — PAIN DESCRIPTION - LOCATION: LOCATION: ABDOMEN

## 2024-03-26 ASSESSMENT — PAIN SCALES - GENERAL: PAINLEVEL_OUTOF10: 5 - MODERATE PAIN

## 2024-03-26 ASSESSMENT — PAIN DESCRIPTION - PAIN TYPE: TYPE: ACUTE PAIN

## 2024-03-26 NOTE — Clinical Note
Alfie Kruger was seen and treated in our emergency department on 3/26/2024.  He may return to school on 03/28/2024.      If you have any questions or concerns, please don't hesitate to call.      Karolyn Burnett MD

## 2024-03-26 NOTE — Clinical Note
Alfie Kruger was seen and treated in our emergency department on 3/26/2024.  He may return to work on 03/28/2024.       If you have any questions or concerns, please don't hesitate to call.      Karolyn Burnett MD

## 2024-03-26 NOTE — PROGRESS NOTES
[facility] MINOR outreach:  Alfie reports that he has been re-admitted.  This CM will follow
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
## 2024-03-28 LAB
BACTERIA BLD CULT: NORMAL
BACTERIA BLD CULT: NORMAL

## 2024-03-29 LAB
ATRIAL RATE: 117 BPM
P AXIS: 71 DEGREES
P OFFSET: 207 MS
P ONSET: 153 MS
PR INTERVAL: 128 MS
Q ONSET: 217 MS
QRS COUNT: 19 BEATS
QRS DURATION: 94 MS
QT INTERVAL: 302 MS
QTC CALCULATION(BAZETT): 421 MS
QTC FREDERICIA: 377 MS
R AXIS: 68 DEGREES
T AXIS: 39 DEGREES
T OFFSET: 368 MS
VENTRICULAR RATE: 117 BPM

## 2024-04-02 ENCOUNTER — PATIENT OUTREACH (OUTPATIENT)
Dept: CARE COORDINATION | Facility: CLINIC | Age: 19
End: 2024-04-02
Payer: COMMERCIAL

## 2024-04-02 NOTE — PROGRESS NOTES
Medications  Medications reviewed with patient/caregiver?: Not applicable (4/2/2024 11:12 AM)    Appointments  Does the patient have a primary care provider?: Yes (4/2/2024 11:12 AM)  Care Management Interventions: Advised patient to make appointment (4/2/2024 11:12 AM)    Patient Teaching  Does the patient have access to their discharge instructions?: Yes (4/2/2024 11:12 AM)  Care Management Interventions: Reviewed instructions with patient (4/2/2024 11:12 AM)  What is the patient's perception of their health status since discharge?: Returned to baseline/stable (4/2/2024 11:12 AM)    Wrap Up  Wrap Up Additional Comments: Alfie reports he is feeling much better, lester food and fluid, voiding and moving bowels.  He has not scheduled an appt with his PCP, said he will even though he hasn't been seen in a long time.  No needs identified (4/2/2024 11:12 AM)

## 2024-04-22 ENCOUNTER — PHARMACY VISIT (OUTPATIENT)
Dept: PHARMACY | Facility: CLINIC | Age: 19
End: 2024-04-22
Payer: COMMERCIAL

## 2024-04-22 PROCEDURE — RXMED WILLOW AMBULATORY MEDICATION CHARGE

## 2024-05-09 ENCOUNTER — PHARMACY VISIT (OUTPATIENT)
Dept: PHARMACY | Facility: CLINIC | Age: 19
End: 2024-05-09
Payer: COMMERCIAL

## 2024-05-09 PROCEDURE — RXMED WILLOW AMBULATORY MEDICATION CHARGE

## 2024-06-13 ENCOUNTER — APPOINTMENT (OUTPATIENT)
Dept: PEDIATRIC ENDOCRINOLOGY | Facility: CLINIC | Age: 19
End: 2024-06-13
Payer: COMMERCIAL

## 2024-06-13 VITALS
BODY MASS INDEX: 30.2 KG/M2 | DIASTOLIC BLOOD PRESSURE: 68 MMHG | SYSTOLIC BLOOD PRESSURE: 118 MMHG | HEART RATE: 91 BPM | WEIGHT: 222.66 LBS

## 2024-06-13 DIAGNOSIS — E66.9 CLASS 1 OBESITY: ICD-10-CM

## 2024-06-13 DIAGNOSIS — E10.9 TYPE 1 DIABETES MELLITUS WITH HEMOGLOBIN A1C GOAL OF LESS THAN 7.0% (MULTI): Primary | ICD-10-CM

## 2024-06-13 LAB — POC HEMOGLOBIN A1C: 6.5 % (ref 4.2–6.5)

## 2024-06-13 PROCEDURE — 83036 HEMOGLOBIN GLYCOSYLATED A1C: CPT | Performed by: INTERNAL MEDICINE

## 2024-06-13 PROCEDURE — RXMED WILLOW AMBULATORY MEDICATION CHARGE

## 2024-06-13 PROCEDURE — 3078F DIAST BP <80 MM HG: CPT | Performed by: INTERNAL MEDICINE

## 2024-06-13 PROCEDURE — 95251 CONT GLUC MNTR ANALYSIS I&R: CPT | Performed by: INTERNAL MEDICINE

## 2024-06-13 PROCEDURE — 3074F SYST BP LT 130 MM HG: CPT | Performed by: INTERNAL MEDICINE

## 2024-06-13 PROCEDURE — 99214 OFFICE O/P EST MOD 30 MIN: CPT | Performed by: INTERNAL MEDICINE

## 2024-06-13 RX ORDER — BLOOD-GLUCOSE SENSOR
EACH MISCELLANEOUS
Qty: 9 EACH | Refills: 3 | Status: SHIPPED | OUTPATIENT
Start: 2024-06-13

## 2024-06-13 RX ORDER — INSULIN LISPRO 100 [IU]/ML
INJECTION, SOLUTION INTRAVENOUS; SUBCUTANEOUS
Qty: 80 ML | Refills: 3 | Status: SHIPPED | OUTPATIENT
Start: 2024-06-13

## 2024-06-13 RX ORDER — BLOOD-GLUCOSE TRANSMITTER
EACH MISCELLANEOUS
Qty: 1 EACH | Refills: 3 | Status: SHIPPED | OUTPATIENT
Start: 2024-06-13 | End: 2025-06-10

## 2024-06-13 RX ORDER — INSULIN GLARGINE-YFGN 100 [IU]/ML
INJECTION, SOLUTION SUBCUTANEOUS
Qty: 30 ML | Refills: 1 | Status: SHIPPED | OUTPATIENT
Start: 2024-06-13

## 2024-06-13 NOTE — PATIENT INSTRUCTIONS
It was good to see you today!    To help with keeping set in place:  Try spray antiperspirant (Rite Guard, Ban) at new pump site 20 minutes before placing set, then clean with alcohol  Skin Tac adhesive wipes after alcohol, let it dry.  Available on [employer]      Return to clinic in 3 months to see nurse [email or web address]    [phone] weekdays 830-5pm  [phone] weekends or after 5pm weekdays   [email or web address]

## 2024-06-13 NOTE — PROGRESS NOTES
Subjective   Alfie Kruger is a 18 y.o. male with type 1 diabetes.   Today Alfie presents to clinic with his  girlfriend .     HPI     Manages diabetes with tandem x2 with control IQ  Tandem x2 with Control IQ   insulin aspart 100 unit/mL (3 mL) pen (NovoLOG)      Active insulin time = 5 hours with Control IQ      Basal Rate   Total Basal Dose: 37.8 units/day   Time units/hr   12:00 AM 1.5    3:00 AM 1.5    6:00 AM 1.6   11:00 AM 1.6    5:00 PM 1.6    9:00 PM 1.6      Blood Glucose Target   Time mg/dL   12:00  - 130    3:00  - 130    6:00  - 110   11:00  - 110    5:00  - 110    9:00  - 130      Sensitivity Factor   Time mg/dL/unit   12:00 AM 20    3:00 AM 20    6:00 AM 20   11:00 AM 20    5:00 PM 20    9:00 PM 20      Carb Ratio   Time g/unit   12:00 AM 7    3:00 AM 7    6:00 AM 5.5   11:00 AM 5.5    5:00 PM 5.5    9:00 PM 5.5      -TDD: 80  -Total daily basal: 48  -Basal %: 60  -Daily carb average: not putting carbs into his pump (18g/day)  Boluses Per Day: 3-4    GLUCOSE MONITORING:  CGM Type: Dexcom G6  CGM wear time (%): 100  SG average: 184   Time in range 70-180mg/dL (%): 54  Time low <70mg/dL (%): 0  Patterns: intermittent prandial hyperglycemia associated with missed carb boluses    Concerns at this visit:    trouble keeping the sets on with perspiration    Not putting carbs into pump very often    Due for annual labs, fasting    Social: working at [employer]     Screens:  Eye exam: due  Labs: 6/2023  Flu shot: declines     Insulin Injections/Pump sites:   - Gives mealtime insulin during or after eating.  - Site rotation: abdomen     Carbohydrate counting:   - Patient states they are fair at counting carbs.  - Patient states they are poor at adherence to bolusing for carbs.     Other:   Hypoglycemia:  - uses juice to treat lows  - treats with 15 gms carbs  - Nocturnal hypoglycemia: no  Hypoglycemia Unawareness : No  Checks ketones with: if glucose is high or if ill      Exercise: work--[employer]     DM Hx:  Date of Diabetes Diagnosis: 03/01/06  ED/Hospitalization related to DKA: Yes  DKA related ED/Hospitalization Date: 03/24/24      Review of Systems   All other systems reviewed and are negative.      Objective   /68 (BP Location: Right arm, Patient Position: Sitting, BP Cuff Size: Adult)   Pulse 91   Wt 101 kg (222 lb 10.6 oz)   BMI 30.20 kg/m²      Physical Exam  Constitutional:       Appearance: Normal appearance.   Eyes:      Conjunctiva/sclera: Conjunctivae normal.   Neck:      Thyroid: No thyromegaly.   Pulmonary:      Effort: Pulmonary effort is normal.   Skin:     General: Skin is warm and dry.   Neurological:      General: No focal deficit present.      Mental Status: He is alert.          Lab Results   Component Value Date    HGBA1C 6.5 06/13/2024    HGBA1C 7.6 (A) 02/29/2024    HGBA1C 7.7 (A) 06/23/2023    HGBA1C 9.2 (A) 11/15/2021       Assessment/Plan   Alfie Kruger is a 18 y.o. male with Type 1 diabetes mellitus and Class 1 obesity  Time in range is below goal due to prandial hyperglycemia (not bolusing for carbs, relies on auto-corrections)  A1c today in goal but does not correlate with CGM download (GMI >7%)    PLAN  No changes to pump settings today, encouraged to bolus more  -     Dexcom G6 Sensor device; Use as directed changing every 10 days  -     Dexcom G6 Transmitter device; Use as directed changing every 90 days  -     insulin lispro (HumaLOG) 100 unit/mL refill for patient own pump; Use up to 80 units daily via insulin pump  -     insulin glargine-yfgn 100 unit/mL vial; Inject 38 units once daily during pump failure  Due for labs  -     Hemoglobin A1C; Future  -     Comprehensive Metabolic Panel; Future  -     TSH with reflex to Free T4 if abnormal; Future  -     Albumin , Urine Random; Future  -     Lipid Panel; Future  FUV 3 mo     CGM Interpretation/Plan   14 day CGM download was reviewed in detail as documented above under GLUCOSE MONITORING  and will be attached to chart.  A minimum of 72 hours of glucose data was used to inform the management plan outlined above.    MD Susie Krishna RN

## 2024-06-17 ENCOUNTER — PHARMACY VISIT (OUTPATIENT)
Dept: PHARMACY | Facility: CLINIC | Age: 19
End: 2024-06-17
Payer: COMMERCIAL

## 2024-07-01 PROCEDURE — RXMED WILLOW AMBULATORY MEDICATION CHARGE

## 2024-07-02 ENCOUNTER — PHARMACY VISIT (OUTPATIENT)
Dept: PHARMACY | Facility: CLINIC | Age: 19
End: 2024-07-02
Payer: COMMERCIAL

## 2024-07-11 PROCEDURE — RXMED WILLOW AMBULATORY MEDICATION CHARGE

## 2024-07-15 ENCOUNTER — HOSPITAL ENCOUNTER (OUTPATIENT)
Dept: RADIOLOGY | Facility: CLINIC | Age: 19
Discharge: HOME | End: 2024-07-15
Payer: COMMERCIAL

## 2024-07-15 DIAGNOSIS — M79.671 RIGHT FOOT PAIN: ICD-10-CM

## 2024-07-15 PROCEDURE — 73630 X-RAY EXAM OF FOOT: CPT | Mod: RIGHT SIDE | Performed by: RADIOLOGY

## 2024-07-15 PROCEDURE — 73630 X-RAY EXAM OF FOOT: CPT | Mod: RT

## 2024-07-18 ENCOUNTER — PHARMACY VISIT (OUTPATIENT)
Dept: PHARMACY | Facility: CLINIC | Age: 19
End: 2024-07-18
Payer: COMMERCIAL

## 2024-09-12 ENCOUNTER — APPOINTMENT (OUTPATIENT)
Dept: PEDIATRIC ENDOCRINOLOGY | Facility: CLINIC | Age: 19
End: 2024-09-12
Payer: COMMERCIAL

## 2024-09-12 VITALS
HEART RATE: 102 BPM | SYSTOLIC BLOOD PRESSURE: 116 MMHG | DIASTOLIC BLOOD PRESSURE: 79 MMHG | WEIGHT: 231.2 LBS | BODY MASS INDEX: 33.1 KG/M2 | HEIGHT: 70 IN

## 2024-09-12 DIAGNOSIS — K90.0 CD (CELIAC DISEASE) (HHS-HCC): Primary | ICD-10-CM

## 2024-09-12 DIAGNOSIS — E10.9 TYPE 1 DIABETES MELLITUS WITH HEMOGLOBIN A1C GOAL OF LESS THAN 7.0% (MULTI): ICD-10-CM

## 2024-09-12 LAB — POC HEMOGLOBIN A1C: 7.5 % (ref 4.2–6.5)

## 2024-09-12 PROCEDURE — 83036 HEMOGLOBIN GLYCOSYLATED A1C: CPT | Performed by: PEDIATRICS

## 2024-09-12 PROCEDURE — 99214 OFFICE O/P EST MOD 30 MIN: CPT | Performed by: PEDIATRICS

## 2024-09-12 PROCEDURE — 3074F SYST BP LT 130 MM HG: CPT | Performed by: PEDIATRICS

## 2024-09-12 PROCEDURE — 95251 CONT GLUC MNTR ANALYSIS I&R: CPT | Performed by: PEDIATRICS

## 2024-09-12 PROCEDURE — 3078F DIAST BP <80 MM HG: CPT | Performed by: PEDIATRICS

## 2024-09-12 PROCEDURE — 3008F BODY MASS INDEX DOCD: CPT | Performed by: PEDIATRICS

## 2024-09-12 PROCEDURE — RXMED WILLOW AMBULATORY MEDICATION CHARGE

## 2024-09-12 RX ORDER — GLUCAGON INJECTION, SOLUTION 1 MG/.2ML
INJECTION, SOLUTION SUBCUTANEOUS
Qty: 0.4 ML | Refills: 1 | Status: SHIPPED | OUTPATIENT
Start: 2024-09-12

## 2024-09-12 ASSESSMENT — ENCOUNTER SYMPTOMS
POLYDIPSIA: 0
NAUSEA: 0
SORE THROAT: 0
ARTHRALGIAS: 0
WHEEZING: 0
UNEXPECTED WEIGHT CHANGE: 0
VOICE CHANGE: 0
WEAKNESS: 0
APPETITE CHANGE: 0
HEADACHES: 0
DIAPHORESIS: 0
SEIZURES: 0
FATIGUE: 0
PALPITATIONS: 0
VOMITING: 0
DIARRHEA: 0
SHORTNESS OF BREATH: 0
CONSTIPATION: 0
ABDOMINAL PAIN: 0
DYSPHORIC MOOD: 0
MYALGIAS: 0
POLYPHAGIA: 0
NERVOUS/ANXIOUS: 0
SLEEP DISTURBANCE: 0
COUGH: 0
ACTIVITY CHANGE: 0

## 2024-09-12 NOTE — PATIENT INSTRUCTIONS
It was good to see you today!    Return to clinic in 3 months to see nurse Susie Daniel RN    [phone] weekdays 830-5pm  [phone] weekends or after 5pm weekdays   [email or web address]     Look into updating your pump-- call [employer] about updating since yours is out of warranty.  [phone]    Get your annual labs done (fasting) asap    A referral was sent to GI-- schedule an appointment to discuss celiac    Today we discussed transitioning to an adult endocrine provider.

## 2024-09-12 NOTE — PROGRESS NOTES
Subjective   Alfie Kruger is a 18 y.o. male with type 1 diabetes. Comes to visit alone.       HPI   Other Medical History: celiac disease - not on gluten free diet. No symptoms. Would like to reevaluate this.     Manages diabetes with Tandem with Control IQ  Current settings  Tandem x2 with Control IQ   insulin aspart 100 unit/mL (3 mL) pen (NovoLOG)   Last edited by Susie Daniel RN on 2/29/2024 at 8:33 AM      Active insulin time = 5 hours with Control IQ      Basal Rate   Total Basal Dose: 37.8 units/day   Time units/hr   12:00 AM 1.5    3:00 AM 1.5    6:00 AM 1.6   11:00 AM 1.6    5:00 PM 1.6    9:00 PM 1.6      Blood Glucose Target   Time mg/dL   12:00  - 130    3:00  - 130    6:00  - 110   11:00  - 110    5:00  - 110    9:00  - 130      Sensitivity Factor   Time mg/dL/unit   12:00 AM 20    3:00 AM 20    6:00 AM 20   11:00 AM 20    5:00 PM 20    9:00 PM 20      Carb Ratio   Time g/unit   12:00 AM 7    3:00 AM 7    6:00 AM 5.5   11:00 AM 5.5    5:00 PM 5.5    9:00 PM 5.5     Back up for pump failure   insulin glargine-yfgn 100 unit/mL vial   Last edited by Lauren Arzola MD on 6/13/2024 at 2:54 PM      Restart pump 24 hours after last glargine dose      Time of Day Dose (units)   once daily with pump failure 40         -TDD: 70  -Total daily basal: 48  -Basal %: 69  -SG average: 171   -CGM wear time (%): 100  -Daily carb average: 5  (not bolusing for carbs very often)     Concerns at this visit:    getting alert from pump about reservoir.  Recommended call Tandem      Social: working at [employer]     Screens:  Eye exam: more than a year.  Labs: ordered last visit, not done yet  Flu shot: declines  Depression screen: n/a     Insulin Injections/Pump sites:   - Gives mealtime insulin during or after eating.  - Site rotation: abdomen     Carbohydrate counting:   - Patient states they are good at counting carbs.  - Patient states they are fair at adherence to bolusing for  "carbs.   -- not following gluten free diet, wants a GI appointment  Other:   Hypoglycemia:  - uses juice pop josselyn milk to treat lows  - treats with 15g+ gms carbs  - Nocturnal hypoglycemia: no  Checks ketones with: not checking very often, if over 350 for an hour and not decreasing     Exercise: work--active     Education Reviewed:      Goals         a1c in target (under 7%) (pt-stated)       Bolus more  Put carbs into pump              Date of Diabetes Diagnosis: 03/01/06  CGM Type: Dexcom G6  Using AID System: Yes  Boluses Per Day: 1  Time in range 70-180mg/dL (%): 60  Time low <70mg/dL (%): 1  Hypoglycemia Unawareness : No  ED/Hospitalizations related to Diabetes: No  ED/Hospitalization not related to Diabetes: No  ED/Hospitalization related to DKA: No  Severe Hypoglycemia (coma, seizure, disorientation, or the need for high dose glucagon) since last visit: No         Review of Systems   Constitutional:  Negative for activity change, appetite change, diaphoresis, fatigue and unexpected weight change.   HENT:  Negative for congestion, sore throat and voice change.    Respiratory:  Negative for cough, shortness of breath and wheezing.    Cardiovascular:  Negative for chest pain and palpitations.   Gastrointestinal:  Negative for abdominal pain, constipation, diarrhea, nausea and vomiting.   Endocrine: Negative for cold intolerance, heat intolerance, polydipsia, polyphagia and polyuria.   Genitourinary:  Negative for enuresis.   Musculoskeletal:  Negative for arthralgias and myalgias.   Skin:  Negative for rash.   Neurological:  Negative for seizures, weakness and headaches.   Psychiatric/Behavioral:  Negative for dysphoric mood and sleep disturbance. The patient is not nervous/anxious.    All other systems reviewed and are negative.      Objective   /79   Pulse 102   Ht 1.776 m (5' 9.92\")   Wt 105 kg (231 lb 3.2 oz)   BMI 33.25 kg/m²      Physical Exam  Vitals reviewed. Exam conducted with a chaperone " present.   Constitutional:       General: He is not in acute distress.     Appearance: Normal appearance.   HENT:      Head: Normocephalic.      Mouth/Throat:      Mouth: Mucous membranes are moist.   Eyes:      Conjunctiva/sclera: Conjunctivae normal.   Neck:      Comments: Normal thyroid, no nodules  Pulmonary:      Effort: Pulmonary effort is normal.   Skin:     General: Skin is warm.      Comments: No lipoatrophy or lipohypertrophy   Neurological:      General: No focal deficit present.      Mental Status: He is alert and oriented to person, place, and time.   Psychiatric:         Mood and Affect: Mood normal.         Behavior: Behavior normal.          Lab  Lab Results   Component Value Date    HGBA1C 7.5 (A) 09/12/2024    HGBA1C 6.5 06/13/2024    HGBA1C 7.6 (A) 02/29/2024    HGBA1C 7.7 (A) 06/23/2023       Assessment/Plan   Alfie Kruger is a 18 y.o. male with type 1 diabetes. HbA1c above target, with interval rise since the last visit. Due for annual labs. BP ok. Schedule eye exam. Needs to upgrade pump - out of warranty.  Placed referral for adult GI - had biopsy proven celiac years ago, but asymptomatic and never went gluten free.         Insulin Instructions  Tandem x2 with Control IQ   insulin aspart 100 unit/mL (3 mL) pen (NovoLOG)   Last edited by Susie Daniel RN on 2/29/2024 at 8:33 AM      Active insulin time = 5 hours with Control IQ      Basal Rate   Total Basal Dose: 37.8 units/day   Time units/hr   12:00 AM 1.5    3:00 AM 1.5    6:00 AM 1.6   11:00 AM 1.6    5:00 PM 1.6    9:00 PM 1.6      Blood Glucose Target   Time mg/dL   12:00  - 130    3:00  - 130    6:00  - 110   11:00  - 110    5:00  - 110    9:00  - 130      Sensitivity Factor   Time mg/dL/unit   12:00 AM 20    3:00 AM 20    6:00 AM 20   11:00 AM 20    5:00 PM 20    9:00 PM 20      Carb Ratio   Time g/unit   12:00 AM 7    3:00 AM 7    6:00 AM 5.5   11:00 AM 5.5    5:00 PM 5.5    9:00 PM 5.5     Back  up for pump failure   insulin glargine-yfgn 100 unit/mL vial   Last edited by Lauren Arzola MD on 6/13/2024 at 2:54 PM      Restart pump 24 hours after last glargine dose      Time of Day Dose (units)   once daily with pump failure 40       CGM Interpretation/Plan   14 day CGM download was reviewed in detail as documented above under GLUCOSE MONITORING and will be attached to chart.  A minimum of 72 hours of glucose data was used to inform the management plan outlined above.    Esteban Osei MD

## 2024-09-13 ENCOUNTER — LAB (OUTPATIENT)
Dept: LAB | Facility: LAB | Age: 19
End: 2024-09-13
Payer: COMMERCIAL

## 2024-09-13 DIAGNOSIS — E10.9 TYPE 1 DIABETES MELLITUS WITH HEMOGLOBIN A1C GOAL OF LESS THAN 7.0% (MULTI): ICD-10-CM

## 2024-09-13 LAB
ALBUMIN SERPL BCP-MCNC: 4 G/DL (ref 3.4–5)
ALP SERPL-CCNC: 87 U/L (ref 33–120)
ALT SERPL W P-5'-P-CCNC: 11 U/L (ref 10–52)
ANION GAP SERPL CALC-SCNC: 10 MMOL/L (ref 10–20)
AST SERPL W P-5'-P-CCNC: 13 U/L (ref 9–39)
BILIRUB SERPL-MCNC: 0.8 MG/DL (ref 0–1.2)
BUN SERPL-MCNC: 13 MG/DL (ref 6–23)
CALCIUM SERPL-MCNC: 8.9 MG/DL (ref 8.6–10.3)
CHLORIDE SERPL-SCNC: 104 MMOL/L (ref 98–107)
CHOLEST SERPL-MCNC: 129 MG/DL (ref 0–199)
CHOLESTEROL/HDL RATIO: 4.1
CO2 SERPL-SCNC: 30 MMOL/L (ref 21–32)
CREAT SERPL-MCNC: 0.66 MG/DL (ref 0.5–1.3)
CREAT UR-MCNC: 118.2 MG/DL (ref 20–370)
EGFRCR SERPLBLD CKD-EPI 2021: >90 ML/MIN/1.73M*2
EST. AVERAGE GLUCOSE BLD GHB EST-MCNC: 154 MG/DL
GLUCOSE SERPL-MCNC: 175 MG/DL (ref 74–99)
HBA1C MFR BLD: 7 %
HDLC SERPL-MCNC: 31.1 MG/DL
LDLC SERPL CALC-MCNC: 88 MG/DL
MICROALBUMIN UR-MCNC: <7 MG/L
MICROALBUMIN/CREAT UR: NORMAL MG/G{CREAT}
NON HDL CHOLESTEROL: 98 MG/DL (ref 0–119)
POTASSIUM SERPL-SCNC: 4.4 MMOL/L (ref 3.5–5.3)
PROT SERPL-MCNC: 6.5 G/DL (ref 6.4–8.2)
SODIUM SERPL-SCNC: 140 MMOL/L (ref 136–145)
TRIGL SERPL-MCNC: 52 MG/DL (ref 0–149)
TSH SERPL-ACNC: 1.26 MIU/L (ref 0.44–3.98)
VLDL: 10 MG/DL (ref 0–40)

## 2024-09-13 PROCEDURE — 84443 ASSAY THYROID STIM HORMONE: CPT

## 2024-09-13 PROCEDURE — 80053 COMPREHEN METABOLIC PANEL: CPT

## 2024-09-13 PROCEDURE — 83036 HEMOGLOBIN GLYCOSYLATED A1C: CPT

## 2024-09-13 PROCEDURE — 82043 UR ALBUMIN QUANTITATIVE: CPT

## 2024-09-13 PROCEDURE — 80061 LIPID PANEL: CPT

## 2024-09-13 PROCEDURE — 36415 COLL VENOUS BLD VENIPUNCTURE: CPT

## 2024-09-13 PROCEDURE — 82570 ASSAY OF URINE CREATININE: CPT

## 2024-09-17 ENCOUNTER — PHARMACY VISIT (OUTPATIENT)
Dept: PHARMACY | Facility: CLINIC | Age: 19
End: 2024-09-17
Payer: COMMERCIAL

## 2024-09-23 DIAGNOSIS — E10.65 TYPE 1 DIABETES MELLITUS WITH HYPERGLYCEMIA (MULTI): Primary | ICD-10-CM

## 2024-09-25 NOTE — PATIENT INSTRUCTIONS
"It was good to see you today.    Make an eye doctor appointment    Work on your goal of lowering your A1c to target by putting your carbs into your pump    Try \"activity mode\" when you are at work to help prevent lows.    Make an appointment for 3 months with Susie Daniel RN  " [Negative] : Heme/Lymph

## 2024-10-02 ENCOUNTER — APPOINTMENT (OUTPATIENT)
Dept: URGENT CARE | Age: 19
End: 2024-10-02
Payer: COMMERCIAL

## 2024-10-11 ENCOUNTER — TELEMEDICINE (OUTPATIENT)
Dept: PHARMACY | Facility: HOSPITAL | Age: 19
End: 2024-10-11
Payer: COMMERCIAL

## 2024-10-11 DIAGNOSIS — E10.65 TYPE 1 DIABETES MELLITUS WITH HYPERGLYCEMIA (MULTI): Primary | ICD-10-CM

## 2024-10-11 NOTE — PATIENT INSTRUCTIONS
Thank you for entrusting your care to the Clinical Pharmacy Team! We look forward to seeing you again soon.  Please call your clinical pharmacist with any questions or concerns.    You are enrolled in the [facility] Employee Value Based Insurance Design (VBID) program. This program allows you to receive for $0 co-pays on diabetes medications/supplies.  To maintain your eligibility for this program, you must:  Maintain  employee insurance coverage  Fill prescriptions at a  pharmacy for pick-up or home delivery  Complete a visit with a clinical pharmacist at least once annually    Jannette Rubio, PharmD  P: [phone]    To schedule an appointment, please contact:  P: [phone]

## 2024-10-11 NOTE — Clinical Note
Employee $0 Diabetes Meds/Supplies Program (VBID) visit completed. No concerns with current regimen. Pt reports resolving issue with Tandem pump reservoir and no further issues occurring.

## 2024-10-11 NOTE — PROGRESS NOTES
[facility] (VBID)    Alfie Kruger is a 18 y.o. male referred to Clinical Pharmacy Team to complete a comprehensive medication review (CMR) with a pharmacist as part of the Value Based Insurance Design (VBID) diabetes program.  Pharmacy team may also provide assistance in diabetes management per discussion with referring provider and/or endocrinology. Referring Provider: Esteban Osei MD    Does patient follow with Endocrinology: Yes - last seen 9/2024  Endocrinology Provider Name: Esteban Osei MD    Subjective   Allergies   Allergen Reactions    Gluten Unknown     Gluten Free diet required, gluten damages small intestines    Penicillins Hives and Rash        [facility]  [address]  [place] [address]  Phone: [phone] Fax: [phone]     [facility]  [address]  [facility] [address]  Phone: [phone] Fax: [phone]    [facility]/pharmacy #[id number] - [place], [place] - [address] AT [facility]  [address]  [address]  [facility] [address]  Phone: [phone] Fax: [phone]      Diabetes  He presents for his follow-up diabetic visit. He has type 1 diabetes mellitus. His disease course has been improving. There are no hypoglycemic complications. Risk factors for coronary artery disease include diabetes mellitus and male sex. Current diabetic treatment includes insulin pump. His weight is fluctuating minimally. Meal planning includes carbohydrate counting. An ACE inhibitor/angiotensin II receptor blocker is not being taken (not indicated at this time). He does not see a podiatrist.Eye exam is not current.     HOME MONITORING  Monitoring Device: Dexcom G6  Monitoring Frequency: continuous     Current home BG readings: Time in range - 60%   Any episodes of hypoglycemia? No, denies .    Did patient treat episode of hypoglycemia appropriately? Yes, uses juice/pop, consumes 15 g carbs    Objective     BP  "Readings from Last 6 Encounters:   09/12/24 116/79   07/15/24 113/76   06/13/24 118/68   03/26/24 133/76   03/25/24 (!) 103/48   02/29/24 120/72      Daily Weight  09/12/24 : 105 kg (231 lb 3.2 oz) (98%, Z= 2.08)*  07/15/24 : 113 kg (250 lb) (>99%, Z= 2.41)*  06/13/24 : 101 kg (222 lb 10.6 oz) (97%, Z= 1.93)*  03/26/24 : 101 kg (223 lb 12.3 oz) (98%, Z= 1.97)*  03/24/24 : 105 kg (232 lb 9.4 oz) (98%, Z= 2.13)*  02/29/24 : 104 kg (230 lb 6.1 oz) (98%, Z= 2.09)*    * Growth percentiles are based on [facility] (Boys, 2-20 Years) data.     LAB  Lab Results   Component Value Date    BILITOT 0.8 09/13/2024    CALCIUM 8.9 09/13/2024    CO2 30 09/13/2024     09/13/2024    CREATININE 0.66 09/13/2024    GLUCOSE 175 (H) 09/13/2024    ALKPHOS 87 09/13/2024    K 4.4 09/13/2024    PROT 6.5 09/13/2024     09/13/2024    AST 13 09/13/2024    ALT 11 09/13/2024    BUN 13 09/13/2024    ANIONGAP 10 09/13/2024    MG 1.93 03/26/2024    PHOS 4.1 03/26/2024    ALBUMIN 4.0 09/13/2024    LIPASE <3 (L) 03/26/2024     Lab Results   Component Value Date    TRIG 52 09/13/2024    CHOL 129 09/13/2024    LDLCALC 88 09/13/2024    HDL 31.1 09/13/2024     Lab Results   Component Value Date    HGBA1C 7.0 (H) 09/13/2024     Estimated body mass index is 33.25 kg/m² as calculated from the following:    Height as of 9/12/24: 1.776 m (5' 9.92\").    Weight as of 9/12/24: 105 kg (231 lb 3.2 oz).     Current Outpatient Medications on File Prior to Visit   Medication Sig Dispense Refill    Dexcom G6 Sensor device Use as directed changing every 10 days 9 each 3    Dexcom G6 Transmitter device Use as directed changing every 90 days 1 each 3    glucagon (Gvoke PFS 2-Pack Syringe) 1 mg/0.2 mL syringe Inject 1 mg as directed for severe hypoglycemia 0.4 mL 1    insulin glargine-yfgn 100 unit/mL vial Inject 38 units once daily during pump failure 30 mL 1    insulin lispro (HumaLOG) 100 unit/mL refill for patient own pump Use up to 80 units daily via insulin pump " 80 mL 3    [DISCONTINUED] insulin aspart (NovoLOG) 100 unit/mL (3 mL) pen Use up to 80 units daily via insulin pump 30 mL 6     No current facility-administered medications on file prior to visit.        MEDICATION RECONCILIATION  Added: none  Changed: none  Removed: none    Drug Interactions:  None warranting change in therapy at this time    CURRENT DIABETES PHARMACOTHERAPY  Insulin lispro via insulin pump (Tandem)  For pump failure:  Insulin lispro - bolus per sliding scale  Insulin glargine-yfgn (Semglee) - basal, 38 units once daily    PREVENTATIVE PHARMACOTHERAPY  Statin? no - not indicated d/t age < 40, LDL < 190 mg/dL  ACE-I/ARB? no  BP: at goal, < 130/80  UACR: normal (< 30 mg/g)  Aspirin?  no    Assessment/Plan   Problem List Items Addressed This Visit       Type 1 diabetes (Multi) - Primary      Diabetes:  Patient's Type 1 diabetes is improved and borderline controlled with most recent A1c of 7.0% on 9/13/24 (Goal < 7%). Overall doing very well on current regimen and denies side effects. Glycemic control has improved over the last few years and denies any hypoglycemic events. Patient was hospitalized 3/2024 for nausea, ketoacidosis, elevated BG, suspected to be due to secondary to illness of unknown etiology. Patient denies any similar events since. Reports no issues with Dexcom or Tandem pump at this time. No concerns with current regimen and plan for yearly follow up for renewal.   Comorbidity/Complication Regimen: appropriate  Concerns with DM regimen: none    Comprehensive Medication Review:  Reviewed all medications on patient medication list in EMR. Discussed indication, administration, MOA and possible side effects to medications. Answered all patient questions and concerns.   Patient denies side effects with medications.   Adherence is expected to be Good.   Additional concerns with medication list: none    VBID Employee Diabetes Program Enrollment: Renewal  Patient enrolled in  Employee  diabetes program for $0 co-pays on diabetes medications/supplies. Renewal enrollment should be active in 2-4 weeks.  Requested VBID enrollment date: 10/11/24  PharmD Management Level: Level 1   Pharmacy fill location: [facility]    Follow up: 10-12 months for renewal      Jannette Rubio PharmD     Continue all meds under the continuation of care with the referring provider and clinical pharmacy team. Patient/Caregiver was informed they may decline to participate or withdraw from participation in pharmacy services at any time.

## 2024-10-24 PROCEDURE — RXMED WILLOW AMBULATORY MEDICATION CHARGE

## 2024-10-25 ENCOUNTER — PHARMACY VISIT (OUTPATIENT)
Dept: PHARMACY | Facility: CLINIC | Age: 19
End: 2024-10-25
Payer: COMMERCIAL

## 2024-11-26 ENCOUNTER — PHARMACY VISIT (OUTPATIENT)
Dept: PHARMACY | Facility: CLINIC | Age: 19
End: 2024-11-26
Payer: COMMERCIAL

## 2024-12-12 ENCOUNTER — APPOINTMENT (OUTPATIENT)
Dept: PEDIATRIC ENDOCRINOLOGY | Facility: CLINIC | Age: 19
End: 2024-12-12
Payer: COMMERCIAL

## 2024-12-12 VITALS
HEART RATE: 96 BPM | DIASTOLIC BLOOD PRESSURE: 62 MMHG | TEMPERATURE: 98.6 F | WEIGHT: 229.72 LBS | SYSTOLIC BLOOD PRESSURE: 116 MMHG | BODY MASS INDEX: 32.89 KG/M2 | RESPIRATION RATE: 18 BRPM | HEIGHT: 70 IN

## 2024-12-12 DIAGNOSIS — E10.9 TYPE 1 DIABETES MELLITUS WITH HEMOGLOBIN A1C GOAL OF LESS THAN 7.0% (MULTI): ICD-10-CM

## 2024-12-12 LAB — POC HEMOGLOBIN A1C: 7.3 % (ref 4.2–6.5)

## 2024-12-12 PROCEDURE — 99214 OFFICE O/P EST MOD 30 MIN: CPT | Performed by: PEDIATRICS

## 2024-12-12 PROCEDURE — 3051F HG A1C>EQUAL 7.0%<8.0%: CPT | Performed by: PEDIATRICS

## 2024-12-12 PROCEDURE — 3074F SYST BP LT 130 MM HG: CPT | Performed by: PEDIATRICS

## 2024-12-12 PROCEDURE — 3078F DIAST BP <80 MM HG: CPT | Performed by: PEDIATRICS

## 2024-12-12 PROCEDURE — 95251 CONT GLUC MNTR ANALYSIS I&R: CPT | Performed by: PEDIATRICS

## 2024-12-12 PROCEDURE — 83036 HEMOGLOBIN GLYCOSYLATED A1C: CPT | Performed by: PEDIATRICS

## 2024-12-12 PROCEDURE — 3062F POS MACROALBUMINURIA REV: CPT | Performed by: PEDIATRICS

## 2024-12-12 PROCEDURE — 3008F BODY MASS INDEX DOCD: CPT | Performed by: PEDIATRICS

## 2024-12-12 PROCEDURE — 3048F LDL-C <100 MG/DL: CPT | Performed by: PEDIATRICS

## 2024-12-12 ASSESSMENT — ENCOUNTER SYMPTOMS
OCCASIONAL FEELINGS OF UNSTEADINESS: 0
DEPRESSION: 0
LOSS OF SENSATION IN FEET: 0

## 2024-12-12 NOTE — PROGRESS NOTES
Subjective   Alfie Kruger is a 18 y.o. male with type 1 diabetes.   Today Alfie presents to clinic alone.    HPI  Other Medical History: celiac disease     Manages diabetes with Tandem with Control IQ  Insulin Instructions  Tandem x2 with Control IQ   insulin aspart 100 unit/mL (3 mL) pen (NovoLOG)   Last edited by Susie Daniel RN on 2/29/2024 at 8:33 AM      Active insulin time = 5 hours with Control IQ      Basal Rate   Total Basal Dose: 37.8 units/day   Time units/hr   12:00 AM 1.5    3:00 AM 1.5    6:00 AM 1.6   11:00 AM 1.6    5:00 PM 1.6    9:00 PM 1.6      Blood Glucose Target   Time mg/dL   12:00  - 130    3:00  - 130    6:00  - 110   11:00  - 110    5:00  - 110    9:00  - 130      Sensitivity Factor   Time mg/dL/unit   12:00 AM 20    3:00 AM 20    6:00 AM 20   11:00 AM 20    5:00 PM 20    9:00 PM 20      Carb Ratio   Time g/unit   12:00 AM 7    3:00 AM 7    6:00 AM 5.5   11:00 AM 5.5    5:00 PM 5.5    9:00 PM 5.5     Back up for pump failure   insulin glargine-yfgn 100 unit/mL vial   Last edited by Lauren Arzola MD on 6/13/2024 at 2:54 PM      Restart pump 24 hours after last glargine dose      Time of Day Dose (units)   once daily with pump failure 40         -TDD: 78  -Total daily basal: 48  -Basal %: 62  -SG average: 184   -CGM wear time (%): 100  -Daily carb average: does not enter carbs into pump     Concerns at this visit:    denies complaints today  Has lost weight by eating better, no fast food.    Social:    works in [employer] department at [employer]  Screens:  Eye exam: unsure  Labs: 9/2024  Flu shot: declines  Depression screen: 3/2024     Insulin Injections/Pump sites:   - Gives mealtime insulin during or after eating.  - Site rotation: stomach, states rotating sites a little better     Carbohydrate counting:   - Patient states they are good at counting carbs.  - Patient states they are fair at adherence to bolusing for carbs.     Other:   Hypoglycemia:  -  "uses tabs to treat lows  - treats with 12-16 gms carbs  - Nocturnal hypoglycemia: no  Checks ketones with: not really checking-- if mouth is \"sugary\" or if he thinks maybe dka     Exercise:      Education Reviewed:      Goals         a1c in target (under 7%) (pt-stated)       Bolus more  Put carbs into pump              Date of Diabetes Diagnosis: 03/01/06  CGM Type: Dexcom G6  Using AID System: Yes  Boluses Per Day: 0  Time in range 70-180mg/dL (%): 57  Time low <70mg/dL (%): 0  Hypoglycemia Unawareness : No  ED/Hospitalizations related to Diabetes: No  ED/Hospitalization related to DKA: No  Severe Hypoglycemia (coma, seizure, disorientation, or the need for high dose glucagon) since last visit: No         Review of Systems   HENT:  Positive for mouth sores.    All other systems reviewed and are negative.      Objective   /62 (BP Location: Right arm, Patient Position: Sitting, BP Cuff Size: Large adult)   Pulse 96   Temp 37 °C (98.6 °F) (Tympanic)   Resp 18   Ht 1.776 m (5' 9.92\")   Wt 104 kg (229 lb 11.5 oz)   BMI 33.04 kg/m²      Physical Exam   General: well appearing male in no distress  HEENT: normal cephalic, atraumatic  Thyroid: mildly enlarged thyroid gland; no cervical lymphadenopathy  CV: RRR  Resp: non labored breathing  Abdomen: non distended  Skin: no lipohypertrophy on abdomen  Neuro: grossly normal movements  Psych: seems content     Lab  Lab Results   Component Value Date    HGBA1C 7.3 (A) 12/12/2024    HGBA1C 7.0 (H) 09/13/2024    HGBA1C 7.5 (A) 09/12/2024    HGBA1C 6.5 06/13/2024       Assessment/Plan   Alfie Kruger is a 18 y.o. male with type 1 diabetes and celiac disease whose A1C is 7.3% today. He also has celiac disease. He has obesity and has lost 20lbs since July though dietary modification, has normal lipids and BP.     CGM Assessment/Plan: Alfie does not bolus, but his auto boluses generally bring him back into range    Plan:    Assessment & Plan  Type 1 diabetes mellitus with " hemoglobin A1c goal of less than 7.0% (Multi)  A1C near target at 7.3%, though not bolusing on Tandem CiQ pump. Pump is out of warranty and encouraged him to call Demetri before the end of the year. No dose changes.     Orders:    POCT glycosylated hemoglobin (Hb A1C) manually resulted    Follow Up In Pediatric Endocrinology; Future    BMI 33.0-33.9,adult  Recent weight loss through dietary change.          Insulin Instructions  Tandem x2 with Control IQ   insulin aspart 100 unit/mL (3 mL) pen (NovoLOG)   Last edited by Susie Daniel RN on 2/29/2024 at 8:33 AM      Active insulin time = 5 hours with Control IQ      Basal Rate   Total Basal Dose: 37.8 units/day   Time units/hr   12:00 AM 1.5    3:00 AM 1.5    6:00 AM 1.6   11:00 AM 1.6    5:00 PM 1.6    9:00 PM 1.6      Blood Glucose Target   Time mg/dL   12:00  - 130    3:00  - 130    6:00  - 110   11:00  - 110    5:00  - 110    9:00  - 130      Sensitivity Factor   Time mg/dL/unit   12:00 AM 20    3:00 AM 20    6:00 AM 20   11:00 AM 20    5:00 PM 20    9:00 PM 20      Carb Ratio   Time g/unit   12:00 AM 7    3:00 AM 7    6:00 AM 5.5   11:00 AM 5.5    5:00 PM 5.5    9:00 PM 5.5     Back up for pump failure   insulin glargine-yfgn 100 unit/mL vial   Last edited by Lauren Arzola MD on 6/13/2024 at 2:54 PM      Restart pump 24 hours after last glargine dose      Time of Day Dose (units)   once daily with pump failure 40     CGM Interpretation/Plan   14 day CGM download was reviewed in detail as documented above under GLUCOSE MONITORING and will be attached to chart.  A minimum of 72 hours of glucose data was used to inform the management plan outlined above.    Angela Mcmanus MD

## 2024-12-12 NOTE — PATIENT INSTRUCTIONS
It was good to see you today!    Consider new pump if your current pump out of warranty and you've met deductible  If you want Tandem pump, call Demetri Browne [phone]      Return to clinic in 3 months to see nurse Susie Daniel RN    [phone] weekdays 830-5pm  [phone] weekends or after 5pm weekdays   [email or web address]

## 2025-01-27 PROCEDURE — RXMED WILLOW AMBULATORY MEDICATION CHARGE

## 2025-01-29 ENCOUNTER — PHARMACY VISIT (OUTPATIENT)
Dept: PHARMACY | Facility: CLINIC | Age: 20
End: 2025-01-29
Payer: COMMERCIAL

## 2025-03-07 ENCOUNTER — PHARMACY VISIT (OUTPATIENT)
Dept: PHARMACY | Facility: CLINIC | Age: 20
End: 2025-03-07
Payer: COMMERCIAL

## 2025-04-09 PROCEDURE — RXMED WILLOW AMBULATORY MEDICATION CHARGE

## 2025-04-10 ENCOUNTER — PHARMACY VISIT (OUTPATIENT)
Dept: PHARMACY | Facility: CLINIC | Age: 20
End: 2025-04-10
Payer: COMMERCIAL

## 2025-06-09 PROCEDURE — RXMED WILLOW AMBULATORY MEDICATION CHARGE

## 2025-06-12 ENCOUNTER — APPOINTMENT (OUTPATIENT)
Dept: PEDIATRIC ENDOCRINOLOGY | Facility: CLINIC | Age: 20
End: 2025-06-12
Payer: COMMERCIAL

## 2025-06-12 ENCOUNTER — PHARMACY VISIT (OUTPATIENT)
Dept: PHARMACY | Facility: CLINIC | Age: 20
End: 2025-06-12
Payer: COMMERCIAL

## 2025-06-12 VITALS
BODY MASS INDEX: 33.07 KG/M2 | RESPIRATION RATE: 18 BRPM | HEIGHT: 70 IN | HEART RATE: 112 BPM | DIASTOLIC BLOOD PRESSURE: 68 MMHG | SYSTOLIC BLOOD PRESSURE: 125 MMHG | WEIGHT: 231 LBS

## 2025-06-12 DIAGNOSIS — E10.9 TYPE 1 DIABETES MELLITUS WITH HEMOGLOBIN A1C GOAL OF LESS THAN 7.0% (MULTI): ICD-10-CM

## 2025-06-12 LAB — POC HEMOGLOBIN A1C: 7.2 % (ref 4.2–6.5)

## 2025-06-12 PROCEDURE — 3051F HG A1C>EQUAL 7.0%<8.0%: CPT | Performed by: PEDIATRICS

## 2025-06-12 PROCEDURE — 3008F BODY MASS INDEX DOCD: CPT | Performed by: PEDIATRICS

## 2025-06-12 PROCEDURE — 3074F SYST BP LT 130 MM HG: CPT | Performed by: PEDIATRICS

## 2025-06-12 PROCEDURE — 83036 HEMOGLOBIN GLYCOSYLATED A1C: CPT | Performed by: PEDIATRICS

## 2025-06-12 PROCEDURE — 95251 CONT GLUC MNTR ANALYSIS I&R: CPT | Performed by: PEDIATRICS

## 2025-06-12 PROCEDURE — 99213 OFFICE O/P EST LOW 20 MIN: CPT | Performed by: PEDIATRICS

## 2025-06-12 PROCEDURE — 3078F DIAST BP <80 MM HG: CPT | Performed by: PEDIATRICS

## 2025-06-12 NOTE — PROGRESS NOTES
[facility]  [facility]    Subjective   Alfie Kruger is a 19 y.o. male with type 1 diabetes.   Today Alfie presents to clinic alone.     HPI  working as an automechanic; healthy interval.  Stole brother's antibiotcs to treat tooth ache; needs a lot of dental work done, was quoted $9000.    Other Medical History: Celiac     Manages diabetes with Tandem CIQ     Insulin Instructions  Tandem x2 with Control IQ   insulin aspart 100 unit/mL (3 mL) pen (NovoLOG)   Last edited by Susie Daniel RN on 2/29/2024 at 8:33 AM      Active insulin time = 5 hours with Control IQ      Basal Rate   Total Basal Dose: 37.8 units/day   Time units/hr   12:00 AM 1.5    3:00 AM 1.5    6:00 AM 1.6   11:00 AM 1.6    5:00 PM 1.6    9:00 PM 1.6      Blood Glucose Target   Time mg/dL   12:00  - 130    3:00  - 130    6:00  - 110   11:00  - 110    5:00  - 110    9:00  - 130      Sensitivity Factor   Time mg/dL/unit   12:00 AM 20    3:00 AM 20    6:00 AM 20   11:00 AM 20    5:00 PM 20    9:00 PM 20      Carb Ratio   Time g/unit   12:00 AM 7    3:00 AM 7    6:00 AM 5.5   11:00 AM 5.5    5:00 PM 5.5    9:00 PM 5.5     Back up for pump failure   insulin glargine-yfgn 100 unit/mL vial   Last edited by Lauren Arzola MD on 6/13/2024 at 2:54 PM      Restart pump 24 hours after last glargine dose      Time of Day Dose (units)   once daily with pump failure 40      Concerns at this visit: tooth ache        Goals         a1c in target (under 7%) (pt-stated)       Bolus more  Put carbs into pump              Diabetes  Date of Diabetes Diagnosis: 03/01/06  Type of Diabetes: Type 1    Insulin Delivery  Diabetes Management Regimen: Pump  Pump Type: Tandem  Using AID System: Yes  Boluses Per Day (user initiated): 2  Total Daily Dose of Insulin (units): 79.81  Total Basal Insulin Per Day (units): 43.55  % Basal: 54.57  % Bolus: 45.43  Total Daily Carbs (grams): 4  Percent  "Automated Mode (%): 98    Glucose Monitoring  How do you primarily monitor blood sugars?: CGM  CGM Type: Dexcom G6  Time in range 70-180mg/dL (%): 53  Time low <70mg/dL (%): 0.2  Time high >180mg/dL (%): 46.8  Average Glucose: 185  Predicted GMI: 7.7    Clinical Details  Hypoglycemia Unawareness : No  Severe Hypoglycemia (coma, seizure, disorientation, or the need for high dose glucagon) since last visit: No    Hospitalizations (since last endocrine appointment)  ED/Hospitalizations related to Diabetes: No  ED/Hospitalization not related to Diabetes: No  ED/Hospitalization related to DKA: No         Screens  Labs: 09/13/24  Eye Exam:  (Due)  Depression Screen: Yes  Depression Screen Date: 06/12/25  Counseling: Not applicable         Review of Systems   Constitutional:  Negative for activity change, appetite change, diaphoresis, fatigue and unexpected weight change.   HENT:  Positive for dental problem. Negative for congestion, sore throat and voice change.    Respiratory:  Negative for cough, shortness of breath and wheezing.    Cardiovascular:  Negative for chest pain and palpitations.   Gastrointestinal:  Negative for abdominal pain, constipation, diarrhea, nausea and vomiting.   Endocrine: Negative for cold intolerance, heat intolerance, polydipsia, polyphagia and polyuria.   Genitourinary:  Negative for enuresis.   Musculoskeletal:  Negative for arthralgias and myalgias.   Skin:  Negative for rash.   Neurological:  Negative for seizures, weakness and headaches.   Psychiatric/Behavioral:  Negative for dysphoric mood and sleep disturbance. The patient is not nervous/anxious.        Objective   /68 (BP Location: Right arm, Patient Position: Sitting)   Pulse (!) 112   Resp 18   Ht 1.787 m (5' 10.35\")   Wt 105 kg (231 lb)   BMI 32.81 kg/m²      Physical Exam  Vitals reviewed. Exam conducted with a chaperone present.   Constitutional:       General: He is not in acute distress.     Appearance: Normal " appearance.   HENT:      Head: Normocephalic.      Mouth/Throat:      Mouth: Mucous membranes are moist.      Comments: Has erythema and swelling over upper left tooth  Eyes:      Conjunctiva/sclera: Conjunctivae normal.   Neck:      Comments: Normal thyroid, no nodules  Pulmonary:      Effort: Pulmonary effort is normal.   Skin:     General: Skin is warm.      Comments: No lipoatrophy or lipohypertrophy   Neurological:      General: No focal deficit present.      Mental Status: He is alert and oriented to person, place, and time.   Psychiatric:         Mood and Affect: Mood normal.         Behavior: Behavior normal.          Lab  Lab Results   Component Value Date    HGBA1C 7.2 (A) 06/12/2025    HGBA1C 7.3 (A) 12/12/2024    HGBA1C 7.0 (H) 09/13/2024    HGBA1C 7.5 (A) 09/12/2024       Assessment/Plan   Alfie Kruger is a 19 y.o. male with type 1 diabetes. HbA1c just above target. Stable since last visit. Good BP. Up to date on labs, but due for eye exam. Celiac, but not consistently following gluten free diet.  Has toothache, put himself on antibiotics, encouraged prompt dental eval, provided him with phone number for free dental clinic.    Glucose Monitoring: not bolusing for carbs, encouraged him to do so.         Insulin Instructions  Tandem x2 with Control IQ   insulin aspart 100 unit/mL (3 mL) pen (NovoLOG)   Last edited by Esteban Osei MD on 6/12/2025 at 10:57 AM      Active insulin time = 5 hours with Control IQ      Basal Rate   Total Basal Dose: 37.8 units/day   Time units/hr   12:00 AM 1.5    3:00 AM 1.5    6:00 AM 1.6   11:00 AM 1.6    5:00 PM 1.6    9:00 PM 1.6      Blood Glucose Target   Time mg/dL   12:00  - 130    3:00  - 130    6:00  - 110   11:00  - 110    5:00  - 110    9:00  - 130      Sensitivity Factor   Time mg/dL/unit   12:00 AM 20    3:00 AM 20    6:00 AM 20   11:00 AM 20    5:00 PM 20    9:00 PM 20      Carb Ratio   Time g/unit   12:00 AM 7    3:00 AM 7     6:00 AM 5.5   11:00 AM 5.5    5:00 PM 5.5    9:00 PM 5.5     Back up for pump failure   insulin glargine-yfgn 100 unit/mL vial   Last edited by Lauren Arzola MD on 6/13/2024 at 2:54 PM      Restart pump 24 hours after last glargine dose      Time of Day Dose (units)   once daily with pump failure 40       CGM Interpretation/Plan   14 day CGM download was reviewed in detail as documented above under GLUCOSE MONITORING and will be attached to chart.  A minimum of 72 hours of glucose data was used to inform the management plan outlined above.    Esteban Osei MD

## 2025-06-12 NOTE — PATIENT INSTRUCTIONS
Work on bolusing for carbs  Schedule eye exam      Call the free dental clinic for an appointment. As a free clinic, [facility] does not accept or bill insurance. If the cost of using your insurance is too high and you would like to become a patient of [facility], please call [phone] to meet with a  to determine your eligibility.     Follow up 3 months

## 2025-06-17 ENCOUNTER — PHARMACY VISIT (OUTPATIENT)
Dept: PHARMACY | Facility: CLINIC | Age: 20
End: 2025-06-17
Payer: COMMERCIAL

## 2025-06-17 ENCOUNTER — HOSPITAL ENCOUNTER (EMERGENCY)
Facility: HOSPITAL | Age: 20
Discharge: HOME | End: 2025-06-17
Payer: COMMERCIAL

## 2025-06-17 VITALS
HEART RATE: 72 BPM | BODY MASS INDEX: 32.93 KG/M2 | SYSTOLIC BLOOD PRESSURE: 132 MMHG | RESPIRATION RATE: 16 BRPM | OXYGEN SATURATION: 98 % | HEIGHT: 70 IN | WEIGHT: 230 LBS | TEMPERATURE: 99.7 F | DIASTOLIC BLOOD PRESSURE: 85 MMHG

## 2025-06-17 DIAGNOSIS — K04.7 DENTAL ABSCESS: Primary | ICD-10-CM

## 2025-06-17 PROCEDURE — 99283 EMERGENCY DEPT VISIT LOW MDM: CPT

## 2025-06-17 PROCEDURE — 2500000001 HC RX 250 WO HCPCS SELF ADMINISTERED DRUGS (ALT 637 FOR MEDICARE OP): Performed by: NURSE PRACTITIONER

## 2025-06-17 PROCEDURE — RXMED WILLOW AMBULATORY MEDICATION CHARGE

## 2025-06-17 RX ORDER — AMOXICILLIN AND CLAVULANATE POTASSIUM 875; 125 MG/1; MG/1
1 TABLET, FILM COATED ORAL ONCE
Status: COMPLETED | OUTPATIENT
Start: 2025-06-17 | End: 2025-06-17

## 2025-06-17 RX ORDER — AMOXICILLIN AND CLAVULANATE POTASSIUM 875; 125 MG/1; MG/1
1 TABLET, FILM COATED ORAL EVERY 12 HOURS
Qty: 20 TABLET | Refills: 0 | Status: SHIPPED | OUTPATIENT
Start: 2025-06-17 | End: 2025-06-28

## 2025-06-17 RX ORDER — IBUPROFEN 600 MG/1
600 TABLET, FILM COATED ORAL EVERY 6 HOURS PRN
Qty: 21 TABLET | Refills: 1 | OUTPATIENT
Start: 2025-06-17

## 2025-06-17 RX ORDER — OXYCODONE HYDROCHLORIDE 5 MG/1
5 TABLET ORAL EVERY 6 HOURS PRN
Qty: 12 TABLET | Refills: 0 | Status: SHIPPED | OUTPATIENT
Start: 2025-06-17 | End: 2025-06-21

## 2025-06-17 RX ORDER — OXYCODONE HYDROCHLORIDE 5 MG/1
10 TABLET ORAL ONCE
Refills: 0 | Status: COMPLETED | OUTPATIENT
Start: 2025-06-17 | End: 2025-06-17

## 2025-06-17 RX ADMIN — OXYCODONE HYDROCHLORIDE 10 MG: 5 TABLET ORAL at 21:45

## 2025-06-17 RX ADMIN — AMOXICILLIN AND CLAVULANATE POTASSIUM 1 TABLET: 875; 125 TABLET, FILM COATED ORAL at 21:45

## 2025-06-17 ASSESSMENT — ENCOUNTER SYMPTOMS
SORE THROAT: 0
UNEXPECTED WEIGHT CHANGE: 0
ARTHRALGIAS: 0
ACTIVITY CHANGE: 0
MYALGIAS: 0
DIAPHORESIS: 0
COUGH: 0
POLYDIPSIA: 0
SLEEP DISTURBANCE: 0
WEAKNESS: 0
SEIZURES: 0
WHEEZING: 0
FATIGUE: 0
DIARRHEA: 0
VOMITING: 0
APPETITE CHANGE: 0
PALPITATIONS: 0
POLYPHAGIA: 0
SHORTNESS OF BREATH: 0
VOICE CHANGE: 0
HEADACHES: 0
NERVOUS/ANXIOUS: 0
ABDOMINAL PAIN: 0
DYSPHORIC MOOD: 0
CONSTIPATION: 0
NAUSEA: 0

## 2025-06-17 ASSESSMENT — LIFESTYLE VARIABLES
HAVE PEOPLE ANNOYED YOU BY CRITICIZING YOUR DRINKING: NO
TOTAL SCORE: 0
EVER HAD A DRINK FIRST THING IN THE MORNING TO STEADY YOUR NERVES TO GET RID OF A HANGOVER: NO
EVER FELT BAD OR GUILTY ABOUT YOUR DRINKING: NO
HAVE YOU EVER FELT YOU SHOULD CUT DOWN ON YOUR DRINKING: NO

## 2025-06-17 ASSESSMENT — PAIN DESCRIPTION - LOCATION: LOCATION: MOUTH

## 2025-06-17 ASSESSMENT — PAIN SCALES - GENERAL: PAINLEVEL_OUTOF10: 7

## 2025-06-17 ASSESSMENT — PAIN - FUNCTIONAL ASSESSMENT: PAIN_FUNCTIONAL_ASSESSMENT: 0-10

## 2025-06-17 ASSESSMENT — PAIN DESCRIPTION - ORIENTATION: ORIENTATION: LEFT

## 2025-06-17 NOTE — Clinical Note
Alfie Kruger was seen and treated in our emergency department on 6/17/2025.  He may return to work on 06/18/2025.  Return wed or thur if better      If you have any questions or concerns, please don't hesitate to call.      Nirmala Putnam, APRN-CNP

## 2025-06-18 ENCOUNTER — PHARMACY VISIT (OUTPATIENT)
Dept: PHARMACY | Facility: CLINIC | Age: 20
End: 2025-06-18
Payer: COMMERCIAL

## 2025-06-18 PROCEDURE — RXMED WILLOW AMBULATORY MEDICATION CHARGE

## 2025-06-18 NOTE — ED TRIAGE NOTES
Pt here for L upper tooth/gum pain and throbbing, edema. States has been ongoing issues and believes has an oral infection. No apparent distress. Denies oral swelling. Denies pus/drainage. Denies fevers/chills.

## 2025-06-18 NOTE — DISCHARGE INSTRUCTIONS
Finish antibiotics    Pain meds as needed = no work on the narcotic pain meds Take ibuprofen tylenol for pain instead.    See dentist of choosing    Return if needed
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
## 2025-06-23 DIAGNOSIS — E10.9 TYPE 1 DIABETES MELLITUS WITH HEMOGLOBIN A1C GOAL OF LESS THAN 7.0% (MULTI): ICD-10-CM

## 2025-06-23 RX ORDER — BLOOD-GLUCOSE SENSOR
EACH MISCELLANEOUS
Qty: 9 EACH | Refills: 3 | Status: SHIPPED | OUTPATIENT
Start: 2025-06-23

## 2025-07-11 PROCEDURE — RXMED WILLOW AMBULATORY MEDICATION CHARGE

## 2025-07-15 ENCOUNTER — PHARMACY VISIT (OUTPATIENT)
Dept: PHARMACY | Facility: CLINIC | Age: 20
End: 2025-07-15
Payer: COMMERCIAL

## 2025-08-18 ENCOUNTER — PHARMACY VISIT (OUTPATIENT)
Dept: PHARMACY | Facility: CLINIC | Age: 20
End: 2025-08-18
Payer: COMMERCIAL

## 2025-08-18 ENCOUNTER — TELEPHONE (OUTPATIENT)
Dept: PEDIATRIC ENDOCRINOLOGY | Facility: CLINIC | Age: 20
End: 2025-08-18
Payer: COMMERCIAL

## 2025-08-18 DIAGNOSIS — E10.9 TYPE 1 DIABETES MELLITUS WITH HEMOGLOBIN A1C GOAL OF LESS THAN 7.0% (MULTI): ICD-10-CM

## 2025-08-18 DIAGNOSIS — E10.9 TYPE 1 DIABETES MELLITUS WITH HEMOGLOBIN A1C GOAL OF LESS THAN 7.0% (MULTI): Primary | ICD-10-CM

## 2025-08-18 PROCEDURE — RXMED WILLOW AMBULATORY MEDICATION CHARGE

## 2025-08-18 RX ORDER — BLOOD-GLUCOSE TRANSMITTER
EACH MISCELLANEOUS
Qty: 1 EACH | Refills: 3 | Status: CANCELLED | OUTPATIENT
Start: 2025-08-18 | End: 2026-08-15

## 2025-08-18 RX ORDER — BLOOD-GLUCOSE TRANSMITTER
EACH MISCELLANEOUS
Qty: 1 EACH | Refills: 2 | Status: SHIPPED | OUTPATIENT
Start: 2025-08-18

## 2025-08-20 ENCOUNTER — APPOINTMENT (OUTPATIENT)
Dept: PHARMACY | Facility: HOSPITAL | Age: 20
End: 2025-08-20
Payer: COMMERCIAL

## 2025-08-20 DIAGNOSIS — E10.65 TYPE 1 DIABETES MELLITUS WITH HYPERGLYCEMIA (MULTI): Primary | ICD-10-CM

## 2025-08-20 RX ORDER — LANCETS
EACH MISCELLANEOUS
Qty: 100 EACH | Refills: 11 | Status: SHIPPED | OUTPATIENT
Start: 2025-08-20

## 2025-08-20 RX ORDER — BLOOD SUGAR DIAGNOSTIC
STRIP MISCELLANEOUS
Qty: 100 EACH | Refills: 11 | Status: SHIPPED | OUTPATIENT
Start: 2025-08-20

## 2025-08-20 RX ORDER — DEXTROSE 4 G
TABLET,CHEWABLE ORAL
Qty: 1 EACH | Refills: 0 | Status: SHIPPED | OUTPATIENT
Start: 2025-08-20

## 2025-08-31 ENCOUNTER — APPOINTMENT (OUTPATIENT)
Dept: CARDIOLOGY | Facility: HOSPITAL | Age: 20
End: 2025-08-31
Payer: COMMERCIAL

## 2025-08-31 ENCOUNTER — HOSPITAL ENCOUNTER (EMERGENCY)
Facility: HOSPITAL | Age: 20
Discharge: HOME | End: 2025-08-31
Payer: COMMERCIAL

## 2025-08-31 VITALS
TEMPERATURE: 97.5 F | OXYGEN SATURATION: 100 % | RESPIRATION RATE: 18 BRPM | DIASTOLIC BLOOD PRESSURE: 74 MMHG | HEART RATE: 74 BPM | HEIGHT: 70 IN | WEIGHT: 230 LBS | BODY MASS INDEX: 32.93 KG/M2 | SYSTOLIC BLOOD PRESSURE: 122 MMHG

## 2025-08-31 DIAGNOSIS — E10.9 TYPE 1 DIABETES MELLITUS WITH HEMOGLOBIN A1C GOAL OF LESS THAN 7.0% (MULTI): ICD-10-CM

## 2025-08-31 DIAGNOSIS — R73.9 HYPERGLYCEMIA: Primary | ICD-10-CM

## 2025-08-31 LAB
ALBUMIN SERPL BCP-MCNC: 4.5 G/DL (ref 3.4–5)
ALP SERPL-CCNC: 115 U/L (ref 33–120)
ALT SERPL W P-5'-P-CCNC: 11 U/L (ref 10–52)
ANION GAP BLDV CALCULATED.4IONS-SCNC: 4 MMOL/L (ref 10–25)
ANION GAP SERPL CALC-SCNC: 16 MMOL/L (ref 10–20)
AST SERPL W P-5'-P-CCNC: 12 U/L (ref 9–39)
BASE EXCESS BLDV CALC-SCNC: 2 MMOL/L (ref -2–3)
BASOPHILS # BLD AUTO: 0.07 X10*3/UL (ref 0–0.1)
BASOPHILS NFR BLD AUTO: 0.9 %
BILIRUB SERPL-MCNC: 1.3 MG/DL (ref 0–1.2)
BODY TEMPERATURE: ABNORMAL
BUN SERPL-MCNC: 14 MG/DL (ref 6–23)
CA-I BLDV-SCNC: 1.17 MMOL/L (ref 1.1–1.33)
CALCIUM SERPL-MCNC: 9.3 MG/DL (ref 8.6–10.3)
CHLORIDE BLDV-SCNC: 101 MMOL/L (ref 98–107)
CHLORIDE SERPL-SCNC: 98 MMOL/L (ref 98–107)
CO2 SERPL-SCNC: 27 MMOL/L (ref 21–32)
CREAT SERPL-MCNC: 0.86 MG/DL (ref 0.5–1.3)
EGFRCR SERPLBLD CKD-EPI 2021: >90 ML/MIN/1.73M*2
EOSINOPHIL # BLD AUTO: 0.56 X10*3/UL (ref 0–0.7)
EOSINOPHIL NFR BLD AUTO: 7.5 %
ERYTHROCYTE [DISTWIDTH] IN BLOOD BY AUTOMATED COUNT: 11.9 % (ref 11.5–14.5)
GLUCOSE BLD MANUAL STRIP-MCNC: 342 MG/DL (ref 74–99)
GLUCOSE BLD MANUAL STRIP-MCNC: 362 MG/DL (ref 74–99)
GLUCOSE BLDV-MCNC: 438 MG/DL (ref 74–99)
GLUCOSE SERPL-MCNC: 375 MG/DL (ref 74–99)
HCO3 BLDV-SCNC: 28.8 MMOL/L (ref 22–26)
HCT VFR BLD AUTO: 48.2 % (ref 41–52)
HCT VFR BLD EST: 53 % (ref 41–52)
HGB BLD-MCNC: 16.8 G/DL (ref 13.5–17.5)
HGB BLDV-MCNC: 17.7 G/DL (ref 13.5–17.5)
IMM GRANULOCYTES # BLD AUTO: 0.01 X10*3/UL (ref 0–0.7)
IMM GRANULOCYTES NFR BLD AUTO: 0.1 % (ref 0–0.9)
INHALED O2 CONCENTRATION: 21 %
LACTATE BLDV-SCNC: 1.2 MMOL/L (ref 0.4–2)
LYMPHOCYTES # BLD AUTO: 1.77 X10*3/UL (ref 1.2–4.8)
LYMPHOCYTES NFR BLD AUTO: 23.6 %
MAGNESIUM SERPL-MCNC: 1.94 MG/DL (ref 1.6–2.4)
MCH RBC QN AUTO: 29.3 PG (ref 26–34)
MCHC RBC AUTO-ENTMCNC: 34.9 G/DL (ref 32–36)
MCV RBC AUTO: 84 FL (ref 80–100)
MONOCYTES # BLD AUTO: 0.63 X10*3/UL (ref 0.1–1)
MONOCYTES NFR BLD AUTO: 8.4 %
NEUTROPHILS # BLD AUTO: 4.47 X10*3/UL (ref 1.2–7.7)
NEUTROPHILS NFR BLD AUTO: 59.5 %
NRBC BLD-RTO: 0 /100 WBCS (ref 0–0)
OXYHGB MFR BLDV: 62.5 % (ref 45–75)
PCO2 BLDV: 51 MM HG (ref 41–51)
PH BLDV: 7.36 PH (ref 7.33–7.43)
PLATELET # BLD AUTO: 236 X10*3/UL (ref 150–450)
PO2 BLDV: 36 MM HG (ref 35–45)
POTASSIUM BLDV-SCNC: 4.7 MMOL/L (ref 3.5–5.3)
POTASSIUM SERPL-SCNC: 4.7 MMOL/L (ref 3.5–5.3)
PROT SERPL-MCNC: 7.5 G/DL (ref 6.4–8.2)
RBC # BLD AUTO: 5.73 X10*6/UL (ref 4.5–5.9)
SAO2 % BLDV: 64 % (ref 45–75)
SODIUM BLDV-SCNC: 129 MMOL/L (ref 136–145)
SODIUM SERPL-SCNC: 136 MMOL/L (ref 136–145)
WBC # BLD AUTO: 7.5 X10*3/UL (ref 4.4–11.3)

## 2025-08-31 PROCEDURE — 84132 ASSAY OF SERUM POTASSIUM: CPT | Performed by: NURSE PRACTITIONER

## 2025-08-31 PROCEDURE — 36415 COLL VENOUS BLD VENIPUNCTURE: CPT | Performed by: NURSE PRACTITIONER

## 2025-08-31 PROCEDURE — 96374 THER/PROPH/DIAG INJ IV PUSH: CPT

## 2025-08-31 PROCEDURE — 2500000002 HC RX 250 W HCPCS SELF ADMINISTERED DRUGS (ALT 637 FOR MEDICARE OP, ALT 636 FOR OP/ED): Performed by: NURSE PRACTITIONER

## 2025-08-31 PROCEDURE — 2500000004 HC RX 250 GENERAL PHARMACY W/ HCPCS (ALT 636 FOR OP/ED): Performed by: NURSE PRACTITIONER

## 2025-08-31 PROCEDURE — 99284 EMERGENCY DEPT VISIT MOD MDM: CPT | Mod: 25

## 2025-08-31 PROCEDURE — 85025 COMPLETE CBC W/AUTO DIFF WBC: CPT | Performed by: NURSE PRACTITIONER

## 2025-08-31 PROCEDURE — 83735 ASSAY OF MAGNESIUM: CPT | Performed by: NURSE PRACTITIONER

## 2025-08-31 PROCEDURE — 82947 ASSAY GLUCOSE BLOOD QUANT: CPT

## 2025-08-31 PROCEDURE — 93005 ELECTROCARDIOGRAM TRACING: CPT

## 2025-08-31 RX ORDER — INSULIN LISPRO 100 [IU]/ML
INJECTION, SOLUTION INTRAVENOUS; SUBCUTANEOUS
Qty: 80 ML | Refills: 0 | Status: SHIPPED | OUTPATIENT
Start: 2025-08-31

## 2025-08-31 RX ORDER — INSULIN GLARGINE-YFGN 100 [IU]/ML
INJECTION, SOLUTION SUBCUTANEOUS
Qty: 30 ML | Refills: 0 | Status: SHIPPED | OUTPATIENT
Start: 2025-08-31

## 2025-08-31 RX ADMIN — INSULIN HUMAN 5 UNITS: 100 INJECTION, SOLUTION PARENTERAL at 11:50

## 2025-08-31 RX ADMIN — SODIUM CHLORIDE 1000 ML: 0.9 INJECTION, SOLUTION INTRAVENOUS at 10:50

## 2025-08-31 ASSESSMENT — LIFESTYLE VARIABLES
EVER HAD A DRINK FIRST THING IN THE MORNING TO STEADY YOUR NERVES TO GET RID OF A HANGOVER: NO
EVER FELT BAD OR GUILTY ABOUT YOUR DRINKING: NO
HAVE YOU EVER FELT YOU SHOULD CUT DOWN ON YOUR DRINKING: NO
TOTAL SCORE: 0
HAVE PEOPLE ANNOYED YOU BY CRITICIZING YOUR DRINKING: NO

## 2025-08-31 ASSESSMENT — PAIN SCALES - GENERAL
PAINLEVEL_OUTOF10: 0 - NO PAIN

## 2025-08-31 ASSESSMENT — PAIN - FUNCTIONAL ASSESSMENT
PAIN_FUNCTIONAL_ASSESSMENT: 0-10
PAIN_FUNCTIONAL_ASSESSMENT: 0-10

## 2025-09-02 ENCOUNTER — PATIENT OUTREACH (OUTPATIENT)
Dept: CARE COORDINATION | Facility: CLINIC | Age: 20
End: 2025-09-02
Payer: COMMERCIAL

## 2025-09-02 LAB
ATRIAL RATE: 59 BPM
P AXIS: 73 DEGREES
P OFFSET: 196 MS
P ONSET: 170 MS
PR INTERVAL: 94 MS
Q ONSET: 217 MS
QRS COUNT: 10 BEATS
QRS DURATION: 94 MS
QT INTERVAL: 406 MS
QTC CALCULATION(BAZETT): 401 MS
QTC FREDERICIA: 403 MS
R AXIS: 55 DEGREES
T AXIS: 43 DEGREES
T OFFSET: 420 MS
VENTRICULAR RATE: 59 BPM

## 2025-09-02 SDOH — ECONOMIC STABILITY: GENERAL: WOULD YOU LIKE HELP WITH ANY OF THE FOLLOWING NEEDS?: I DONT NEED HELP WITH ANY OF THESE

## 2025-09-02 SDOH — ECONOMIC STABILITY: FOOD INSECURITY
ARE ANY OF YOUR NEEDS URGENT? FOR EXAMPLE, UNCERTAINTY OF WHERE YOU WILL GET YOUR NEXT MEAL OR NOT HAVING THE MEDICATIONS YOU NEED TO TAKE TOMORROW.: NO

## 2025-12-18 ENCOUNTER — APPOINTMENT (OUTPATIENT)
Dept: PEDIATRIC ENDOCRINOLOGY | Facility: CLINIC | Age: 20
End: 2025-12-18
Payer: COMMERCIAL